# Patient Record
Sex: FEMALE | Race: WHITE | NOT HISPANIC OR LATINO | Employment: OTHER | URBAN - METROPOLITAN AREA
[De-identification: names, ages, dates, MRNs, and addresses within clinical notes are randomized per-mention and may not be internally consistent; named-entity substitution may affect disease eponyms.]

---

## 2023-04-17 LAB
ALANINE AMINOTRANSFERASE (SGPT) (U/L) IN SER/PLAS: 16 U/L (ref 7–45)
ALBUMIN (G/DL) IN SER/PLAS: 4.2 G/DL (ref 3.4–5)
ALKALINE PHOSPHATASE (U/L) IN SER/PLAS: 153 U/L (ref 33–136)
ANION GAP IN SER/PLAS: 13 MMOL/L (ref 10–20)
ASPARTATE AMINOTRANSFERASE (SGOT) (U/L) IN SER/PLAS: 17 U/L (ref 9–39)
BILIRUBIN TOTAL (MG/DL) IN SER/PLAS: 1.5 MG/DL (ref 0–1.2)
CALCIDIOL (25 OH VITAMIN D3) (NG/ML) IN SER/PLAS: 41 NG/ML
CALCIUM (MG/DL) IN SER/PLAS: 9.5 MG/DL (ref 8.6–10.3)
CARBON DIOXIDE, TOTAL (MMOL/L) IN SER/PLAS: 27 MMOL/L (ref 21–32)
CHLORIDE (MMOL/L) IN SER/PLAS: 102 MMOL/L (ref 98–107)
CREATININE (MG/DL) IN SER/PLAS: 1.22 MG/DL (ref 0.5–1.05)
GFR FEMALE: 46 ML/MIN/1.73M2
GLUCOSE (MG/DL) IN SER/PLAS: 103 MG/DL (ref 74–99)
POTASSIUM (MMOL/L) IN SER/PLAS: 4 MMOL/L (ref 3.5–5.3)
PROTEIN TOTAL: 7.1 G/DL (ref 6.4–8.2)
SODIUM (MMOL/L) IN SER/PLAS: 138 MMOL/L (ref 136–145)
THYROTROPIN (MIU/L) IN SER/PLAS BY DETECTION LIMIT <= 0.05 MIU/L: 0.14 MIU/L (ref 0.44–3.98)
THYROXINE (T4) FREE (NG/DL) IN SER/PLAS: 1.67 NG/DL (ref 0.61–1.12)
UREA NITROGEN (MG/DL) IN SER/PLAS: 17 MG/DL (ref 6–23)

## 2023-04-18 LAB
APPEARANCE, URINE: CLEAR
BILIRUBIN, URINE: NEGATIVE
BLOOD, URINE: NEGATIVE
COLOR, URINE: COLORLESS
GLUCOSE, URINE: NEGATIVE MG/DL
KETONES, URINE: NEGATIVE MG/DL
LEUKOCYTE ESTERASE, URINE: NEGATIVE
NITRITE, URINE: NEGATIVE
PH, URINE: 5 (ref 5–8)
PROTEIN, URINE: NEGATIVE MG/DL
SPECIFIC GRAVITY, URINE: 1 (ref 1–1.03)
UROBILINOGEN, URINE: <2 MG/DL (ref 0–1.9)

## 2023-04-19 LAB — URINE CULTURE: NORMAL

## 2023-07-24 LAB
ALANINE AMINOTRANSFERASE (SGPT) (U/L) IN SER/PLAS: 24 U/L (ref 7–45)
ALBUMIN (G/DL) IN SER/PLAS: 4 G/DL (ref 3.4–5)
ALKALINE PHOSPHATASE (U/L) IN SER/PLAS: 132 U/L (ref 33–136)
ANION GAP IN SER/PLAS: 13 MMOL/L (ref 10–20)
ASPARTATE AMINOTRANSFERASE (SGOT) (U/L) IN SER/PLAS: 18 U/L (ref 9–39)
BILIRUBIN TOTAL (MG/DL) IN SER/PLAS: 1.7 MG/DL (ref 0–1.2)
CALCIDIOL (25 OH VITAMIN D3) (NG/ML) IN SER/PLAS: 43 NG/ML
CALCIUM (MG/DL) IN SER/PLAS: 9.1 MG/DL (ref 8.6–10.3)
CARBON DIOXIDE, TOTAL (MMOL/L) IN SER/PLAS: 27 MMOL/L (ref 21–32)
CHLORIDE (MMOL/L) IN SER/PLAS: 106 MMOL/L (ref 98–107)
CREATININE (MG/DL) IN SER/PLAS: 1.17 MG/DL (ref 0.5–1.05)
GFR FEMALE: 48 ML/MIN/1.73M2
GLUCOSE (MG/DL) IN SER/PLAS: 91 MG/DL (ref 74–99)
POTASSIUM (MMOL/L) IN SER/PLAS: 3.9 MMOL/L (ref 3.5–5.3)
PROTEIN TOTAL: 6.8 G/DL (ref 6.4–8.2)
SODIUM (MMOL/L) IN SER/PLAS: 142 MMOL/L (ref 136–145)
THYROPEROXIDASE AB (IU/ML) IN SER/PLAS: <28 IU/ML
THYROTROPIN (MIU/L) IN SER/PLAS BY DETECTION LIMIT <= 0.05 MIU/L: 3.43 MIU/L (ref 0.44–3.98)
THYROXINE (T4) FREE (NG/DL) IN SER/PLAS: 1.45 NG/DL (ref 0.61–1.12)
TRIIODOTHYRONINE (T3) FREE (PG/ML) IN SER/PLAS: 2.7 PG/ML (ref 2.3–4.2)
UREA NITROGEN (MG/DL) IN SER/PLAS: 18 MG/DL (ref 6–23)

## 2023-07-27 LAB — TSH RECEPTOR ANTIBODY: <0.8 IU/L

## 2023-07-28 LAB — THYROID STIMULATING IMMUNOGLOBULIN: <1 TSI INDEX

## 2023-09-11 LAB
ALANINE AMINOTRANSFERASE (SGPT) (U/L) IN SER/PLAS: 22 U/L (ref 7–45)
ALBUMIN (G/DL) IN SER/PLAS: 4.1 G/DL (ref 3.4–5)
ALBUMIN (G/DL) IN SER/PLAS: 4.1 G/DL (ref 3.4–5)
ALKALINE PHOSPHATASE (U/L) IN SER/PLAS: 128 U/L (ref 33–136)
ANION GAP IN SER/PLAS: 13 MMOL/L (ref 10–20)
ASPARTATE AMINOTRANSFERASE (SGOT) (U/L) IN SER/PLAS: 19 U/L (ref 9–39)
BILIRUBIN DIRECT (MG/DL) IN SER/PLAS: 0.2 MG/DL (ref 0–0.3)
BILIRUBIN TOTAL (MG/DL) IN SER/PLAS: 1.8 MG/DL (ref 0–1.2)
CALCIUM (MG/DL) IN SER/PLAS: 9.2 MG/DL (ref 8.6–10.3)
CARBON DIOXIDE, TOTAL (MMOL/L) IN SER/PLAS: 28 MMOL/L (ref 21–32)
CHLORIDE (MMOL/L) IN SER/PLAS: 99 MMOL/L (ref 98–107)
CREATININE (MG/DL) IN SER/PLAS: 1.35 MG/DL (ref 0.5–1.05)
GFR FEMALE: 41 ML/MIN/1.73M2
GLUCOSE (MG/DL) IN SER/PLAS: 106 MG/DL (ref 74–99)
PHOSPHATE (MG/DL) IN SER/PLAS: 3.8 MG/DL (ref 2.5–4.9)
POTASSIUM (MMOL/L) IN SER/PLAS: 4.7 MMOL/L (ref 3.5–5.3)
PROTEIN TOTAL: 6.7 G/DL (ref 6.4–8.2)
SODIUM (MMOL/L) IN SER/PLAS: 135 MMOL/L (ref 136–145)
UREA NITROGEN (MG/DL) IN SER/PLAS: 19 MG/DL (ref 6–23)

## 2023-09-19 PROBLEM — K92.1 MELENA: Status: ACTIVE | Noted: 2023-09-19

## 2023-09-19 PROBLEM — K44.9 HIATAL HERNIA WITH GERD: Status: ACTIVE | Noted: 2023-09-19

## 2023-09-19 PROBLEM — I10 ESSENTIAL HYPERTENSION: Status: ACTIVE | Noted: 2023-09-19

## 2023-09-19 PROBLEM — I47.29 PAROXYSMAL VENTRICULAR TACHYCARDIA (MULTI): Status: ACTIVE | Noted: 2023-09-19

## 2023-09-19 PROBLEM — Z96.659 STATUS POST TOTAL KNEE REPLACEMENT: Status: ACTIVE | Noted: 2023-09-19

## 2023-09-19 PROBLEM — I47.20 PAROXYSMAL VENTRICULAR TACHYCARDIA: Status: ACTIVE | Noted: 2023-09-19

## 2023-09-19 PROBLEM — M89.9 SCAPULAR DYSFUNCTION: Status: ACTIVE | Noted: 2023-09-19

## 2023-09-19 PROBLEM — M54.2 NECK PAIN: Status: ACTIVE | Noted: 2023-09-19

## 2023-09-19 PROBLEM — R00.2 PALPITATIONS: Status: ACTIVE | Noted: 2023-09-19

## 2023-09-19 PROBLEM — E87.6 HYPOKALEMIA: Status: ACTIVE | Noted: 2023-09-19

## 2023-09-19 PROBLEM — I25.9 ISCHEMIA OF HEART, CHRONIC: Status: ACTIVE | Noted: 2023-09-19

## 2023-09-19 PROBLEM — K21.9 HIATAL HERNIA WITH GERD: Status: ACTIVE | Noted: 2023-09-19

## 2023-09-19 PROBLEM — I07.1 TRICUSPID REGURGITATION: Status: ACTIVE | Noted: 2023-09-19

## 2023-09-19 PROBLEM — R00.0 WIDE-COMPLEX TACHYCARDIA: Status: ACTIVE | Noted: 2023-09-19

## 2023-09-19 PROBLEM — M47.812 CERVICAL SPINE DEGENERATION: Status: ACTIVE | Noted: 2023-09-19

## 2023-09-19 PROBLEM — N95.2 VAGINAL ATROPHY: Status: ACTIVE | Noted: 2023-09-19

## 2023-09-19 PROBLEM — E78.2 MIXED HYPERLIPIDEMIA: Status: ACTIVE | Noted: 2023-09-19

## 2023-09-19 PROBLEM — M54.12 CERVICAL RADICULAR PAIN: Status: ACTIVE | Noted: 2023-09-19

## 2023-09-19 PROBLEM — S29.012A RHOMBOID MUSCLE STRAIN: Status: ACTIVE | Noted: 2023-09-19

## 2023-09-19 PROBLEM — N18.30 STAGE 3 CHRONIC KIDNEY DISEASE (MULTI): Status: ACTIVE | Noted: 2023-09-19

## 2023-09-19 PROBLEM — I25.10 CORONARY ARTERY DISEASE INVOLVING NATIVE CORONARY ARTERY OF NATIVE HEART WITHOUT ANGINA PECTORIS: Status: ACTIVE | Noted: 2023-09-19

## 2023-09-19 PROBLEM — E03.9 HYPOTHYROIDISM: Status: ACTIVE | Noted: 2023-09-19

## 2023-09-19 PROBLEM — R09.89 CAROTID BRUIT: Status: ACTIVE | Noted: 2023-09-19

## 2023-09-19 RX ORDER — ATORVASTATIN CALCIUM 10 MG/1
1 TABLET, FILM COATED ORAL DAILY
COMMUNITY
End: 2023-12-12

## 2023-09-19 RX ORDER — AMIODARONE HYDROCHLORIDE 200 MG/1
200 TABLET ORAL DAILY
COMMUNITY
Start: 2013-09-17 | End: 2024-01-12 | Stop reason: SDUPTHER

## 2023-09-19 RX ORDER — PANTOPRAZOLE SODIUM 40 MG/1
40 TABLET, DELAYED RELEASE ORAL DAILY
COMMUNITY

## 2023-09-19 RX ORDER — ACETAMINOPHEN 500 MG
TABLET ORAL
COMMUNITY
End: 2023-10-02 | Stop reason: SDUPTHER

## 2023-09-19 RX ORDER — POTASSIUM CHLORIDE 750 MG/1
10 TABLET, EXTENDED RELEASE ORAL DAILY
COMMUNITY
Start: 2023-04-15

## 2023-09-19 RX ORDER — LOSARTAN POTASSIUM 100 MG/1
1 TABLET ORAL DAILY
COMMUNITY
End: 2023-10-02 | Stop reason: ALTCHOICE

## 2023-09-19 RX ORDER — LEVOTHYROXINE SODIUM 50 UG/1
1 TABLET ORAL DAILY
COMMUNITY

## 2023-09-19 RX ORDER — LANOLIN ALCOHOL/MO/W.PET/CERES
1 CREAM (GRAM) TOPICAL DAILY
COMMUNITY
End: 2023-12-20 | Stop reason: SDUPTHER

## 2023-09-19 RX ORDER — NITROGLYCERIN 0.4 MG/1
0.4 TABLET SUBLINGUAL EVERY 5 MIN PRN
COMMUNITY

## 2023-09-19 RX ORDER — AMLODIPINE BESYLATE 5 MG/1
1 TABLET ORAL DAILY
COMMUNITY
Start: 2022-07-27 | End: 2023-11-08 | Stop reason: ALTCHOICE

## 2023-09-19 RX ORDER — KETOCONAZOLE 20 MG/ML
1 SHAMPOO, SUSPENSION TOPICAL SEE ADMIN INSTRUCTIONS
COMMUNITY

## 2023-10-02 ENCOUNTER — OFFICE VISIT (OUTPATIENT)
Dept: OBSTETRICS AND GYNECOLOGY | Facility: CLINIC | Age: 78
End: 2023-10-02
Payer: MEDICARE

## 2023-10-02 ENCOUNTER — TELEPHONE (OUTPATIENT)
Dept: OBSTETRICS AND GYNECOLOGY | Facility: CLINIC | Age: 78
End: 2023-10-02

## 2023-10-02 VITALS — WEIGHT: 147 LBS | BODY MASS INDEX: 28 KG/M2 | DIASTOLIC BLOOD PRESSURE: 60 MMHG | SYSTOLIC BLOOD PRESSURE: 124 MMHG

## 2023-10-02 DIAGNOSIS — N84.0 ENDOMETRIAL POLYP: Primary | ICD-10-CM

## 2023-10-02 PROCEDURE — 58558 HYSTEROSCOPY BIOPSY: CPT | Performed by: OBSTETRICS & GYNECOLOGY

## 2023-10-02 PROCEDURE — 88305 TISSUE EXAM BY PATHOLOGIST: CPT

## 2023-10-02 PROCEDURE — 3074F SYST BP LT 130 MM HG: CPT | Performed by: OBSTETRICS & GYNECOLOGY

## 2023-10-02 PROCEDURE — 1126F AMNT PAIN NOTED NONE PRSNT: CPT | Performed by: OBSTETRICS & GYNECOLOGY

## 2023-10-02 PROCEDURE — 3078F DIAST BP <80 MM HG: CPT | Performed by: OBSTETRICS & GYNECOLOGY

## 2023-10-02 PROCEDURE — 88305 TISSUE EXAM BY PATHOLOGIST: CPT | Performed by: PATHOLOGY

## 2023-10-02 PROCEDURE — 99204 OFFICE O/P NEW MOD 45 MIN: CPT | Performed by: OBSTETRICS & GYNECOLOGY

## 2023-10-02 PROCEDURE — 1159F MED LIST DOCD IN RCRD: CPT | Performed by: OBSTETRICS & GYNECOLOGY

## 2023-10-02 RX ORDER — MIRABEGRON 25 MG/1
TABLET, FILM COATED, EXTENDED RELEASE ORAL
COMMUNITY
Start: 2023-08-09 | End: 2024-06-06 | Stop reason: WASHOUT

## 2023-10-02 RX ORDER — ACETAMINOPHEN 500 MG
TABLET ORAL
COMMUNITY

## 2023-10-02 ASSESSMENT — ENCOUNTER SYMPTOMS
LOSS OF SENSATION IN FEET: 0
DEPRESSION: 0
OCCASIONAL FEELINGS OF UNSTEADINESS: 0

## 2023-10-02 ASSESSMENT — PAIN SCALES - GENERAL: PAINLEVEL: 0-NO PAIN

## 2023-10-02 NOTE — PROGRESS NOTES
HYSTEROSCOPY WITH POLYPECTOMY OFFICE PROCEDURE NOTE    Patient was counseling on risks of office Hysteroscopy.  A written consent was then obtained.  Patient was placed in lithotomy position on the procedure room table.  A speculum was placed in the vagina.  A paracervical block of 10 mL of 0.25% marcaine was placed.     The Avipsy Opal hysteroscope was then inserted vaginascopicly into the endometrial cavity, normal saline was used for the hysteroscopic fluid medium.  The resectoscope was placed through the hysteroscope  tissue was removed under direct visualization. Procedure was then ended, the instruments removed from the uterus and vagina. The patient tolerated the procedure well.  Blood loss was minimal. Pathology sent.    Arash Juarez MD

## 2023-10-02 NOTE — PROGRESS NOTES
Patient is a 78 y.o. year old who presents to the clinic today for endometrial polyps.    Problems:  - Patient is having issues with urinary incontinence.  Noted that she doesn't realize when she is leaking.  Feels that she is about 80-90% better with the medication she was provided.  - She had an ultrasound done which showed endometrial polyps.    Discussion  - Reviewed urge incontinence and discussed treatment options which include intradetrusor Botox injections or interstim.  - Recommended a hysteroscopy with endometrial biopsy to remove the endometrial polyps.    Assessment:  78 y.o. female being assessed for urge incontinence and endometrial polyps. S/P hysteroscopy with endometrial biopsy.    Plan  - Follow pathology    Follow up as scheduled    Patrick HAIDER am scribing for virtually, and in the presence of Dr. Arash Juarez on  10/02/23 at 1:19 PM     Agree with idania, Arash Juarez, I personally performed the services described in the documentation which was scribed virtually confirm is both complete and accurate. DS

## 2023-10-05 ENCOUNTER — HOSPITAL ENCOUNTER (OUTPATIENT)
Dept: RESPIRATORY THERAPY | Facility: HOSPITAL | Age: 78
Discharge: HOME | End: 2023-10-05
Payer: MEDICARE

## 2023-10-05 DIAGNOSIS — Z79.899 OTHER LONG TERM (CURRENT) DRUG THERAPY: ICD-10-CM

## 2023-10-05 DIAGNOSIS — Z79.899 HIGH RISK MEDICATION USE: ICD-10-CM

## 2023-10-05 LAB
MGC ASCENT PFT - FEV1 - PRE: 2.06
MGC ASCENT PFT - FEV1 - PRE: 2.06
MGC ASCENT PFT - FEV1 - PREDICTED: 1.77
MGC ASCENT PFT - FEV1 - PREDICTED: 1.77
MGC ASCENT PFT - FVC - PRE: 2.53
MGC ASCENT PFT - FVC - PRE: 2.53
MGC ASCENT PFT - FVC - PREDICTED: 2.28
MGC ASCENT PFT - FVC - PREDICTED: 2.28

## 2023-10-05 PROCEDURE — 94200 LUNG FUNCTION TEST (MBC/MVV): CPT

## 2023-10-06 ENCOUNTER — TELEPHONE (OUTPATIENT)
Dept: CARDIOLOGY | Facility: CLINIC | Age: 78
End: 2023-10-06
Payer: COMMERCIAL

## 2023-10-10 LAB
LABORATORY COMMENT REPORT: NORMAL
PATH REPORT.FINAL DX SPEC: NORMAL
PATH REPORT.GROSS SPEC: NORMAL
PATH REPORT.RELEVANT HX SPEC: NORMAL
PATH REPORT.TOTAL CANCER: NORMAL

## 2023-10-24 ENCOUNTER — LAB (OUTPATIENT)
Dept: LAB | Facility: LAB | Age: 78
End: 2023-10-24
Payer: MEDICARE

## 2023-10-24 DIAGNOSIS — E03.9 HYPOTHYROIDISM, UNSPECIFIED: Primary | ICD-10-CM

## 2023-10-24 DIAGNOSIS — E55.9 VITAMIN D DEFICIENCY, UNSPECIFIED: ICD-10-CM

## 2023-10-24 DIAGNOSIS — E03.9 HYPOTHYROIDISM, UNSPECIFIED: ICD-10-CM

## 2023-10-24 LAB
25(OH)D3 SERPL-MCNC: 50 NG/ML (ref 30–100)
ALBUMIN SERPL BCP-MCNC: 4 G/DL (ref 3.4–5)
ALP SERPL-CCNC: 120 U/L (ref 33–136)
ALT SERPL W P-5'-P-CCNC: 17 U/L (ref 7–45)
ANION GAP SERPL CALC-SCNC: 14 MMOL/L (ref 10–20)
AST SERPL W P-5'-P-CCNC: 18 U/L (ref 9–39)
BILIRUB SERPL-MCNC: 2.2 MG/DL (ref 0–1.2)
BUN SERPL-MCNC: 30 MG/DL (ref 6–23)
CALCIUM SERPL-MCNC: 9.2 MG/DL (ref 8.6–10.3)
CHLORIDE SERPL-SCNC: 98 MMOL/L (ref 98–107)
CO2 SERPL-SCNC: 27 MMOL/L (ref 21–32)
CREAT SERPL-MCNC: 1.49 MG/DL (ref 0.5–1.05)
ERYTHROCYTE [DISTWIDTH] IN BLOOD BY AUTOMATED COUNT: 14.8 % (ref 11.5–14.5)
GFR SERPL CREATININE-BSD FRML MDRD: 36 ML/MIN/1.73M*2
GLUCOSE SERPL-MCNC: 98 MG/DL (ref 74–99)
HCT VFR BLD AUTO: 41.5 % (ref 36–46)
HGB BLD-MCNC: 13.3 G/DL (ref 12–16)
MCH RBC QN AUTO: 28.1 PG (ref 26–34)
MCHC RBC AUTO-ENTMCNC: 32 G/DL (ref 32–36)
MCV RBC AUTO: 88 FL (ref 80–100)
NRBC BLD-RTO: 0 /100 WBCS (ref 0–0)
PLATELET # BLD AUTO: 226 X10*3/UL (ref 150–450)
PMV BLD AUTO: 10.6 FL (ref 7.5–11.5)
POTASSIUM SERPL-SCNC: 4.4 MMOL/L (ref 3.5–5.3)
PROT SERPL-MCNC: 6.5 G/DL (ref 6.4–8.2)
RBC # BLD AUTO: 4.73 X10*6/UL (ref 4–5.2)
SODIUM SERPL-SCNC: 135 MMOL/L (ref 136–145)
T4 FREE SERPL-MCNC: 1.83 NG/DL (ref 0.61–1.12)
TSH SERPL-ACNC: 1.15 MIU/L (ref 0.44–3.98)
WBC # BLD AUTO: 8.7 X10*3/UL (ref 4.4–11.3)

## 2023-10-24 PROCEDURE — 85027 COMPLETE CBC AUTOMATED: CPT

## 2023-10-24 PROCEDURE — 84439 ASSAY OF FREE THYROXINE: CPT

## 2023-10-24 PROCEDURE — 84443 ASSAY THYROID STIM HORMONE: CPT

## 2023-10-24 PROCEDURE — 82306 VITAMIN D 25 HYDROXY: CPT

## 2023-10-24 PROCEDURE — 36415 COLL VENOUS BLD VENIPUNCTURE: CPT

## 2023-10-24 PROCEDURE — 80053 COMPREHEN METABOLIC PANEL: CPT

## 2023-11-08 ENCOUNTER — OFFICE VISIT (OUTPATIENT)
Dept: OBSTETRICS AND GYNECOLOGY | Facility: CLINIC | Age: 78
End: 2023-11-08
Payer: MEDICARE

## 2023-11-08 VITALS — SYSTOLIC BLOOD PRESSURE: 122 MMHG | DIASTOLIC BLOOD PRESSURE: 62 MMHG | WEIGHT: 148 LBS | BODY MASS INDEX: 28.19 KG/M2

## 2023-11-08 DIAGNOSIS — N39.41 URGE INCONTINENCE OF URINE: Primary | ICD-10-CM

## 2023-11-08 DIAGNOSIS — N39.41 URGE INCONTINENCE: ICD-10-CM

## 2023-11-08 PROCEDURE — 3078F DIAST BP <80 MM HG: CPT | Performed by: OBSTETRICS & GYNECOLOGY

## 2023-11-08 PROCEDURE — 3074F SYST BP LT 130 MM HG: CPT | Performed by: OBSTETRICS & GYNECOLOGY

## 2023-11-08 PROCEDURE — 1126F AMNT PAIN NOTED NONE PRSNT: CPT | Performed by: OBSTETRICS & GYNECOLOGY

## 2023-11-08 PROCEDURE — 1159F MED LIST DOCD IN RCRD: CPT | Performed by: OBSTETRICS & GYNECOLOGY

## 2023-11-08 PROCEDURE — 99214 OFFICE O/P EST MOD 30 MIN: CPT | Performed by: OBSTETRICS & GYNECOLOGY

## 2023-11-08 NOTE — PROGRESS NOTES
GYN PROGRESS NOTE          CC:     Chief Complaint   Patient presents with    Follow-up     Est pt hysteroscopy fu  Pt gets dull pain on right breast  Chaperone student       HPI:  Patient answers are not available for this visit.  HPI       Follow-up     Additional comments: Est pt hysteroscopy fu  Pt gets dull pain on right breast  Chaperone student          Last edited by Hilda Reynolds MA on 2023 10:44 AM.      Benign polyps      ROS:  GEN - no fevers or chills  RESP - no SOB or cough  GYN - see HPI      HISTORY:  Past Medical History:   Diagnosis Date    Anesthesia of skin 2020    Numbness and tingling    Body mass index (BMI) 31.0-31.9, adult 2022    BMI 31.0-31.9,adult    Other general symptoms and signs 2020    Eye, ear, nose, and throat symptom    Other specified counseling 2022    Encounter for medication counseling    Overweight 2022    Overweight    Pain in unspecified knee     Knee pain    Person consulting for explanation of examination or test findings 2022    Encounter to discuss test results    Personal history of (healed) traumatic fracture     History of fracture of foot    Personal history of other complications of pregnancy, childbirth and the puerperium     History of spontaneous     Personal history of other diseases of the circulatory system     History of hypertension    Personal history of other diseases of the circulatory system 2020    History of cardiac disorder    Personal history of other diseases of the digestive system     History of hiatal hernia    Personal history of other diseases of the musculoskeletal system and connective tissue 2020    History of arthritis    Personal history of other diseases of the respiratory system 2021    History of sinusitis    Personal history of other endocrine, nutritional and metabolic disease     History of hyperlipidemia    Personal history of other medical treatment 2015     History of screening mammography    Personal history of other specified conditions 12/06/2021    History of dizziness    Unspecified visual loss 11/17/2020    Vision problems     Past Surgical History:   Procedure Laterality Date    CATARACT EXTRACTION  02/27/2014    Cataract Surgery    CHOLECYSTECTOMY  02/27/2014    Cholecystectomy    DILATION AND CURETTAGE OF UTERUS  02/27/2014    Dilation And Curettage    OTHER SURGICAL HISTORY  02/27/2014    Shaving Of Lesion Mohs' Technique    OTHER SURGICAL HISTORY  12/06/2021    Destruction    OTHER SURGICAL HISTORY  12/06/2021    Colonoscopy    OTHER SURGICAL HISTORY  12/06/2021    Percutaneous transluminal coronary angioplasty    OTHER SURGICAL HISTORY  12/06/2021    Knee surgery    OTHER SURGICAL HISTORY  07/27/2022    Colonoscopy    OTHER SURGICAL HISTORY  07/27/2022    Esophagogastroduodenoscopy    OTHER SURGICAL HISTORY  11/17/2020    Tonsillectomy    TOTAL KNEE ARTHROPLASTY  02/27/2014    Knee Replacement     Social History     Socioeconomic History    Marital status:      Spouse name: Not on file    Number of children: Not on file    Years of education: Not on file    Highest education level: Not on file   Occupational History    Not on file   Tobacco Use    Smoking status: Never    Smokeless tobacco: Not on file   Substance and Sexual Activity    Alcohol use: Not on file    Drug use: Not on file    Sexual activity: Not Currently     Comment:  passed   Other Topics Concern    Not on file   Social History Narrative    Not on file     Social Determinants of Health     Financial Resource Strain: Not on file   Food Insecurity: Not on file   Transportation Needs: Not on file   Physical Activity: Not on file   Stress: Not on file   Social Connections: Not on file   Intimate Partner Violence: Not on file   Housing Stability: Not on file     Family history is unknown by patient.       PHYSICAL EXAM:  /62 (BP Location: Left arm, Patient Position: Sitting)    Wt 67.1 kg (148 lb)   BMI 28.19 kg/m²   Physical examination:  General: No distress  Neck: No masses  Respiratory: No respiratory distress  Breasts: No masses or lesions lymphatic chains bilateral and adnexa without lymphadenopathy  Abdomen: soft nontender no hernias      IMPRESSION/PLAN:  78-year-old mastalgia normal exam, history of postmenopausal bleeding benign polypectomy performed, urge inontinence with financially toxic cost on Myrbetriq      Plan intradetrusor Botox 100U  May have breast imaging if desires          Arash Juarez MD

## 2023-11-27 ENCOUNTER — LAB (OUTPATIENT)
Dept: LAB | Facility: LAB | Age: 78
End: 2023-11-27
Payer: MEDICARE

## 2023-11-27 DIAGNOSIS — E55.9 VITAMIN D DEFICIENCY, UNSPECIFIED: ICD-10-CM

## 2023-11-27 DIAGNOSIS — R94.6 ABNORMAL RESULTS OF THYROID FUNCTION STUDIES: ICD-10-CM

## 2023-11-27 DIAGNOSIS — N18.31 CHRONIC KIDNEY DISEASE, STAGE 3A (MULTI): Primary | ICD-10-CM

## 2023-11-27 LAB
25(OH)D3 SERPL-MCNC: 45 NG/ML (ref 30–100)
ALBUMIN SERPL BCP-MCNC: 4.1 G/DL (ref 3.4–5)
ANION GAP SERPL CALC-SCNC: 15 MMOL/L (ref 10–20)
BUN SERPL-MCNC: 25 MG/DL (ref 6–23)
CALCIUM SERPL-MCNC: 9.1 MG/DL (ref 8.6–10.3)
CHLORIDE SERPL-SCNC: 95 MMOL/L (ref 98–107)
CO2 SERPL-SCNC: 26 MMOL/L (ref 21–32)
CREAT SERPL-MCNC: 1.55 MG/DL (ref 0.5–1.05)
CREAT UR-MCNC: 62.1 MG/DL (ref 20–320)
ERYTHROCYTE [DISTWIDTH] IN BLOOD BY AUTOMATED COUNT: 15 % (ref 11.5–14.5)
GFR SERPL CREATININE-BSD FRML MDRD: 35 ML/MIN/1.73M*2
GLUCOSE SERPL-MCNC: 106 MG/DL (ref 74–99)
HCT VFR BLD AUTO: 40.5 % (ref 36–46)
HGB BLD-MCNC: 13 G/DL (ref 12–16)
MCH RBC QN AUTO: 28.8 PG (ref 26–34)
MCHC RBC AUTO-ENTMCNC: 32.1 G/DL (ref 32–36)
MCV RBC AUTO: 90 FL (ref 80–100)
MICROALBUMIN UR-MCNC: <7 MG/L
MICROALBUMIN/CREAT UR: NORMAL MG/G{CREAT}
NRBC BLD-RTO: 0 /100 WBCS (ref 0–0)
PHOSPHATE SERPL-MCNC: 4.5 MG/DL (ref 2.5–4.9)
PLATELET # BLD AUTO: 205 X10*3/UL (ref 150–450)
POTASSIUM SERPL-SCNC: 4.1 MMOL/L (ref 3.5–5.3)
PTH-INTACT SERPL-MCNC: 45.6 PG/ML (ref 18.5–88)
RBC # BLD AUTO: 4.52 X10*6/UL (ref 4–5.2)
SODIUM SERPL-SCNC: 132 MMOL/L (ref 136–145)
TSH SERPL-ACNC: 1.41 MIU/L (ref 0.44–3.98)
WBC # BLD AUTO: 7.8 X10*3/UL (ref 4.4–11.3)

## 2023-11-27 PROCEDURE — 80069 RENAL FUNCTION PANEL: CPT

## 2023-11-27 PROCEDURE — 36415 COLL VENOUS BLD VENIPUNCTURE: CPT

## 2023-11-27 PROCEDURE — 83970 ASSAY OF PARATHORMONE: CPT

## 2023-11-27 PROCEDURE — 85027 COMPLETE CBC AUTOMATED: CPT

## 2023-11-27 PROCEDURE — 84443 ASSAY THYROID STIM HORMONE: CPT

## 2023-11-27 PROCEDURE — 82043 UR ALBUMIN QUANTITATIVE: CPT

## 2023-11-27 PROCEDURE — 82570 ASSAY OF URINE CREATININE: CPT

## 2023-11-27 PROCEDURE — 82306 VITAMIN D 25 HYDROXY: CPT

## 2023-11-30 DIAGNOSIS — E55.9 VITAMIN D DEFICIENCY, UNSPECIFIED: ICD-10-CM

## 2023-11-30 DIAGNOSIS — E03.9 HYPOTHYROIDISM, UNSPECIFIED: Primary | ICD-10-CM

## 2023-12-05 ENCOUNTER — PROCEDURE VISIT (OUTPATIENT)
Dept: OBSTETRICS AND GYNECOLOGY | Facility: CLINIC | Age: 78
End: 2023-12-05
Payer: MEDICARE

## 2023-12-05 VITALS — DIASTOLIC BLOOD PRESSURE: 74 MMHG | SYSTOLIC BLOOD PRESSURE: 124 MMHG

## 2023-12-05 DIAGNOSIS — N39.41 URGE INCONTINENCE OF URINE: ICD-10-CM

## 2023-12-05 DIAGNOSIS — R32 URINARY INCONTINENCE, UNSPECIFIED TYPE: ICD-10-CM

## 2023-12-05 LAB
POC BILIRUBIN, URINE: NEGATIVE
POC BLOOD, URINE: ABNORMAL
POC GLUCOSE, URINE: NEGATIVE MG/DL
POC KETONES, URINE: NEGATIVE MG/DL
POC LEUKOCYTES, URINE: ABNORMAL
POC NITRITE,URINE: NEGATIVE
POC PH, URINE: 6 PH
POC PROTEIN, URINE: NEGATIVE MG/DL
POC SPECIFIC GRAVITY, URINE: 1.01
POC UROBILINOGEN, URINE: 0.2 EU/DL

## 2023-12-05 PROCEDURE — 81003 URINALYSIS AUTO W/O SCOPE: CPT | Performed by: OBSTETRICS & GYNECOLOGY

## 2023-12-05 PROCEDURE — 52287 CYSTOSCOPY CHEMODENERVATION: CPT | Performed by: OBSTETRICS & GYNECOLOGY

## 2023-12-05 RX ORDER — ESTRADIOL 0.1 MG/G
CREAM VAGINAL
COMMUNITY
Start: 2023-05-11 | End: 2023-12-05 | Stop reason: SDUPTHER

## 2023-12-05 RX ORDER — FLUOCINONIDE TOPICAL SOLUTION USP, 0.05% 0.5 MG/ML
SOLUTION TOPICAL
COMMUNITY
Start: 2019-08-22

## 2023-12-05 RX ORDER — LISINOPRIL AND HYDROCHLOROTHIAZIDE 10; 12.5 MG/1; MG/1
TABLET ORAL
COMMUNITY
Start: 2023-09-26

## 2023-12-05 RX ORDER — ESTRADIOL 0.1 MG/G
CREAM VAGINAL
Qty: 42.5 G | Refills: 3 | Status: SHIPPED | OUTPATIENT
Start: 2023-12-05 | End: 2023-12-06 | Stop reason: SDUPTHER

## 2023-12-05 RX ORDER — FLUTICASONE PROPIONATE 50 MCG
SPRAY, SUSPENSION (ML) NASAL
COMMUNITY
Start: 2023-09-27

## 2023-12-05 RX ORDER — LIDOCAINE HYDROCHLORIDE 20 MG/ML
1 JELLY TOPICAL ONCE
Status: COMPLETED | OUTPATIENT
Start: 2023-12-05 | End: 2023-12-05

## 2023-12-05 RX ORDER — MELOXICAM 15 MG/1
15 TABLET ORAL DAILY PRN
COMMUNITY
Start: 2023-05-02 | End: 2023-12-05 | Stop reason: WASHOUT

## 2023-12-05 RX ORDER — DIAZEPAM 5 MG/1
TABLET ORAL
COMMUNITY
Start: 2023-06-24 | End: 2023-12-05 | Stop reason: WASHOUT

## 2023-12-05 RX ADMIN — LIDOCAINE HYDROCHLORIDE 1 APPLICATION: 20 JELLY TOPICAL at 11:55

## 2023-12-05 ASSESSMENT — PAIN SCALES - GENERAL: PAINLEVEL: 0-NO PAIN

## 2023-12-05 NOTE — PROGRESS NOTES
"GYN PROGRESS NOTE          CC:   No chief complaint on file.      HPI:  Patient answers are not available for this visit.  HPI    Katt  is a 77 yo patient here for a Cystoscopy with Intradetrusor Botox.  This is her 1st visit for intradetrusor botox.    Today, she has complaints of incontinence of urine.  The procedure was explained by CARLOS QUINTERO and Dr. Juarez  Consent was signed.  The patient had no questions or concerns at this time.  Doxycyline 100mg PO was given per office protocol.  See MAR  Time out was completed.  Verification Completed:  2 patient identifiers  Correct procedure  Correct site  Correct position  Correct equipment   Botox 100 Units \"office supply\"  I/O UA obtained see results.   LOT [id number]  EXP  10/2025  Chaperone CARLOS Quintero  Discharge instructions were reviewed.  The patient did not have any questions or concerns at this time. A follow up was scheduled.     Last edited by Ange Doe RN on 2023 11:11 AM.            ROS:  GEN - no fevers or chills  RESP - no SOB or cough  GYN - see HPI      HISTORY:  Past Medical History:   Diagnosis Date    Anesthesia of skin 2020    Numbness and tingling    Body mass index (BMI) 31.0-31.9, adult 2022    BMI 31.0-31.9,adult    Other general symptoms and signs 2020    Eye, ear, nose, and throat symptom    Other specified counseling 2022    Encounter for medication counseling    Overweight 2022    Overweight    Pain in unspecified knee     Knee pain    Person consulting for explanation of examination or test findings 2022    Encounter to discuss test results    Personal history of (healed) traumatic fracture     History of fracture of foot    Personal history of other complications of pregnancy, childbirth and the puerperium     History of spontaneous     Personal history of other diseases of the circulatory system     History of hypertension    Personal history of other diseases of the circulatory " system 11/17/2020    History of cardiac disorder    Personal history of other diseases of the digestive system     History of hiatal hernia    Personal history of other diseases of the musculoskeletal system and connective tissue 11/17/2020    History of arthritis    Personal history of other diseases of the respiratory system 12/06/2021    History of sinusitis    Personal history of other endocrine, nutritional and metabolic disease     History of hyperlipidemia    Personal history of other medical treatment 07/28/2015    History of screening mammography    Personal history of other specified conditions 12/06/2021    History of dizziness    Unspecified visual loss 11/17/2020    Vision problems     Past Surgical History:   Procedure Laterality Date    CATARACT EXTRACTION  02/27/2014    Cataract Surgery    CHOLECYSTECTOMY  02/27/2014    Cholecystectomy    DILATION AND CURETTAGE OF UTERUS  02/27/2014    Dilation And Curettage    OTHER SURGICAL HISTORY  02/27/2014    Shaving Of Lesion Mohs' Technique    OTHER SURGICAL HISTORY  12/06/2021    Destruction    OTHER SURGICAL HISTORY  12/06/2021    Colonoscopy    OTHER SURGICAL HISTORY  12/06/2021    Percutaneous transluminal coronary angioplasty    OTHER SURGICAL HISTORY  12/06/2021    Knee surgery    OTHER SURGICAL HISTORY  07/27/2022    Colonoscopy    OTHER SURGICAL HISTORY  07/27/2022    Esophagogastroduodenoscopy    OTHER SURGICAL HISTORY  11/17/2020    Tonsillectomy    TOTAL KNEE ARTHROPLASTY  02/27/2014    Knee Replacement     Social History     Socioeconomic History    Marital status:      Spouse name: Not on file    Number of children: Not on file    Years of education: Not on file    Highest education level: Not on file   Occupational History    Not on file   Tobacco Use    Smoking status: Never    Smokeless tobacco: Never   Vaping Use    Vaping Use: Never used   Substance and Sexual Activity    Alcohol use: Not on file    Drug use: Not on file    Sexual  activity: Not Currently     Comment:  passed   Other Topics Concern    Not on file   Social History Narrative    Not on file     Social Determinants of Health     Financial Resource Strain: Not on file   Food Insecurity: Not on file   Transportation Needs: Not on file   Physical Activity: Not on file   Stress: Not on file   Social Connections: Not on file   Intimate Partner Violence: Not on file   Housing Stability: Not on file     Family history is unknown by patient.       PHYSICAL EXAM:  /74 (BP Location: Left arm, Patient Position: Sitting, BP Cuff Size: Adult)   LMP  (LMP Unknown)   GEN:  A&O, NAD  HEENT:  head HC/AT, no visible goiter  PSYCH:  normal affect, non-anxious  Urethral carbuncle noted      Operative procedure patient was taken to the operating room after informed consent was obtained she is placed in supine position mask anesthesia was started without complication. She is prepped and draped in the normal sterile fashion and operative cystoscopy was placed inside the bladder bowel surfaces were surveyed bilateral reflux was noted from the ureter orifices. A urethral inspection was then performed upon removal of the cystoscope the bladder was drained and refilled clear sterile water. In a fan like projection 0.5ml aliquots of diluted Botox were introduced into the bladder muscle. The flush was used to inject into the trigone. Total Botox use was 100U in approximately 20ml saline. Scant bleeding was noted from the urothelial tissue, the bladder was drained and patient was awoken from anesthesia and transferred to PACU in stable condition.      IMPRESSION/PLAN:    78-year-old urge incontinence status post 100 units intradetrusor Botox  Follow-up in 6 months for repeat procedure discontinue oral bladder medication in 10 days  Call if issue        Arash Juarez MD

## 2023-12-06 DIAGNOSIS — R32 URINARY INCONTINENCE, UNSPECIFIED TYPE: ICD-10-CM

## 2023-12-06 RX ORDER — ESTRADIOL 0.1 MG/G
CREAM VAGINAL
Qty: 42.5 G | Refills: 3 | Status: SHIPPED | OUTPATIENT
Start: 2023-12-06

## 2023-12-08 DIAGNOSIS — E78.2 MIXED HYPERLIPIDEMIA: Primary | ICD-10-CM

## 2023-12-12 RX ORDER — ATORVASTATIN CALCIUM 10 MG/1
10 TABLET, FILM COATED ORAL DAILY
Qty: 90 TABLET | Refills: 3 | Status: SHIPPED | OUTPATIENT
Start: 2023-12-12

## 2023-12-20 DIAGNOSIS — R00.2 PALPITATIONS: ICD-10-CM

## 2023-12-20 DIAGNOSIS — I47.29 PAROXYSMAL VENTRICULAR TACHYCARDIA (MULTI): ICD-10-CM

## 2023-12-20 RX ORDER — LANOLIN ALCOHOL/MO/W.PET/CERES
1 CREAM (GRAM) TOPICAL 2 TIMES DAILY
Qty: 180 TABLET | Refills: 3 | Status: SHIPPED | OUTPATIENT
Start: 2023-12-20 | End: 2024-01-02 | Stop reason: SDUPTHER

## 2024-01-02 DIAGNOSIS — I47.29 PAROXYSMAL VENTRICULAR TACHYCARDIA (MULTI): ICD-10-CM

## 2024-01-02 DIAGNOSIS — R00.2 PALPITATIONS: ICD-10-CM

## 2024-01-02 DIAGNOSIS — I10 ESSENTIAL HYPERTENSION: ICD-10-CM

## 2024-01-02 NOTE — TELEPHONE ENCOUNTER
Patient left message [facility] mail away pharmacy told her they cannot get [facility] to fill her metoprolol succinate 25 mg one a day. Patient sees Dr Rivera will send to Sallie GALARZA NP to authorize. When I called her she also asked for magnesium oxide 400mg BID . Also sent to Sallie.

## 2024-01-03 RX ORDER — LANOLIN ALCOHOL/MO/W.PET/CERES
1 CREAM (GRAM) TOPICAL 2 TIMES DAILY
Qty: 180 TABLET | Refills: 3 | Status: SHIPPED | OUTPATIENT
Start: 2024-01-03 | End: 2025-01-02

## 2024-01-08 ENCOUNTER — APPOINTMENT (OUTPATIENT)
Dept: OBSTETRICS AND GYNECOLOGY | Facility: CLINIC | Age: 79
End: 2024-01-08
Payer: MEDICARE

## 2024-01-09 ENCOUNTER — LAB (OUTPATIENT)
Dept: LAB | Facility: LAB | Age: 79
End: 2024-01-09
Payer: MEDICARE

## 2024-01-09 DIAGNOSIS — E03.9 HYPOTHYROIDISM, UNSPECIFIED: Primary | ICD-10-CM

## 2024-01-09 DIAGNOSIS — E55.9 VITAMIN D DEFICIENCY, UNSPECIFIED: ICD-10-CM

## 2024-01-09 LAB
25(OH)D3 SERPL-MCNC: 43 NG/ML (ref 30–100)
25(OH)D3 SERPL-MCNC: 45 NG/ML (ref 30–100)
ALBUMIN SERPL BCP-MCNC: 3.9 G/DL (ref 3.4–5)
ALBUMIN SERPL BCP-MCNC: 4.1 G/DL (ref 3.4–5)
ALP SERPL-CCNC: 112 U/L (ref 33–136)
ALP SERPL-CCNC: 112 U/L (ref 33–136)
ALT SERPL W P-5'-P-CCNC: 18 U/L (ref 7–45)
ALT SERPL W P-5'-P-CCNC: 19 U/L (ref 7–45)
ANION GAP SERPL CALC-SCNC: 13 MMOL/L (ref 10–20)
ANION GAP SERPL CALC-SCNC: 15 MMOL/L (ref 10–20)
AST SERPL W P-5'-P-CCNC: 17 U/L (ref 9–39)
AST SERPL W P-5'-P-CCNC: 19 U/L (ref 9–39)
BILIRUB SERPL-MCNC: 2.4 MG/DL (ref 0–1.2)
BILIRUB SERPL-MCNC: 2.5 MG/DL (ref 0–1.2)
BUN SERPL-MCNC: 15 MG/DL (ref 6–23)
BUN SERPL-MCNC: 16 MG/DL (ref 6–23)
CALCIUM SERPL-MCNC: 8.7 MG/DL (ref 8.6–10.3)
CALCIUM SERPL-MCNC: 9.2 MG/DL (ref 8.6–10.3)
CHLORIDE SERPL-SCNC: 88 MMOL/L (ref 98–107)
CHLORIDE SERPL-SCNC: 88 MMOL/L (ref 98–107)
CO2 SERPL-SCNC: 26 MMOL/L (ref 21–32)
CO2 SERPL-SCNC: 27 MMOL/L (ref 21–32)
CREAT SERPL-MCNC: 1.13 MG/DL (ref 0.5–1.05)
CREAT SERPL-MCNC: 1.2 MG/DL (ref 0.5–1.05)
EGFRCR SERPLBLD CKD-EPI 2021: 47 ML/MIN/1.73M*2
EGFRCR SERPLBLD CKD-EPI 2021: 51 ML/MIN/1.73M*2
ERYTHROCYTE [DISTWIDTH] IN BLOOD BY AUTOMATED COUNT: 13.8 % (ref 11.5–14.5)
GLUCOSE SERPL-MCNC: 94 MG/DL (ref 74–99)
GLUCOSE SERPL-MCNC: 98 MG/DL (ref 74–99)
HCT VFR BLD AUTO: 38.2 % (ref 36–46)
HGB BLD-MCNC: 12.7 G/DL (ref 12–16)
MCH RBC QN AUTO: 29.1 PG (ref 26–34)
MCHC RBC AUTO-ENTMCNC: 33.2 G/DL (ref 32–36)
MCV RBC AUTO: 88 FL (ref 80–100)
NRBC BLD-RTO: 0 /100 WBCS (ref 0–0)
PLATELET # BLD AUTO: 270 X10*3/UL (ref 150–450)
POTASSIUM SERPL-SCNC: 4 MMOL/L (ref 3.5–5.3)
POTASSIUM SERPL-SCNC: 4.1 MMOL/L (ref 3.5–5.3)
PROT SERPL-MCNC: 6.1 G/DL (ref 6.4–8.2)
PROT SERPL-MCNC: 6.7 G/DL (ref 6.4–8.2)
RBC # BLD AUTO: 4.36 X10*6/UL (ref 4–5.2)
SODIUM SERPL-SCNC: 124 MMOL/L (ref 136–145)
SODIUM SERPL-SCNC: 125 MMOL/L (ref 136–145)
T3FREE SERPL-MCNC: 2.6 PG/ML (ref 2.3–4.2)
T4 FREE SERPL-MCNC: 1.68 NG/DL (ref 0.61–1.12)
T4 FREE SERPL-MCNC: 1.8 NG/DL (ref 0.61–1.12)
TSH SERPL-ACNC: 1.55 MIU/L (ref 0.44–3.98)
TSH SERPL-ACNC: 1.64 MIU/L (ref 0.44–3.98)
WBC # BLD AUTO: 8.9 X10*3/UL (ref 4.4–11.3)

## 2024-01-09 PROCEDURE — 84443 ASSAY THYROID STIM HORMONE: CPT

## 2024-01-09 PROCEDURE — 80053 COMPREHEN METABOLIC PANEL: CPT

## 2024-01-09 PROCEDURE — 82306 VITAMIN D 25 HYDROXY: CPT

## 2024-01-09 PROCEDURE — 84439 ASSAY OF FREE THYROXINE: CPT

## 2024-01-09 PROCEDURE — 85027 COMPLETE CBC AUTOMATED: CPT

## 2024-01-09 PROCEDURE — 84481 FREE ASSAY (FT-3): CPT

## 2024-01-09 PROCEDURE — 36415 COLL VENOUS BLD VENIPUNCTURE: CPT

## 2024-01-12 ENCOUNTER — OFFICE VISIT (OUTPATIENT)
Dept: CARDIOLOGY | Facility: CLINIC | Age: 79
End: 2024-01-12
Payer: MEDICARE

## 2024-01-12 VITALS
SYSTOLIC BLOOD PRESSURE: 130 MMHG | BODY MASS INDEX: 28.62 KG/M2 | DIASTOLIC BLOOD PRESSURE: 68 MMHG | HEART RATE: 57 BPM | WEIGHT: 149 LBS

## 2024-01-12 DIAGNOSIS — I10 ESSENTIAL HYPERTENSION: ICD-10-CM

## 2024-01-12 DIAGNOSIS — R00.2 PALPITATIONS: ICD-10-CM

## 2024-01-12 DIAGNOSIS — Z87.891 FORMER SMOKER: ICD-10-CM

## 2024-01-12 DIAGNOSIS — I49.3 PVC (PREMATURE VENTRICULAR CONTRACTION): ICD-10-CM

## 2024-01-12 DIAGNOSIS — I25.10 CORONARY ARTERY DISEASE INVOLVING NATIVE CORONARY ARTERY OF NATIVE HEART WITHOUT ANGINA PECTORIS: ICD-10-CM

## 2024-01-12 DIAGNOSIS — Z79.899 HIGH RISK MEDICATION USE: ICD-10-CM

## 2024-01-12 DIAGNOSIS — R00.0 WIDE-COMPLEX TACHYCARDIA: ICD-10-CM

## 2024-01-12 DIAGNOSIS — Z71.89 ENCOUNTER FOR MEDICATION REVIEW AND COUNSELING: ICD-10-CM

## 2024-01-12 DIAGNOSIS — Z71.89 ENCOUNTER TO DISCUSS TREATMENT OPTIONS: ICD-10-CM

## 2024-01-12 DIAGNOSIS — I47.29 PAROXYSMAL VENTRICULAR TACHYCARDIA (MULTI): Primary | ICD-10-CM

## 2024-01-12 DIAGNOSIS — E78.2 MIXED HYPERLIPIDEMIA: ICD-10-CM

## 2024-01-12 PROBLEM — N39.41 URGE INCONTINENCE OF URINE: Status: ACTIVE | Noted: 2023-05-15

## 2024-01-12 PROBLEM — R42 DIZZINESS: Status: ACTIVE | Noted: 2024-01-12

## 2024-01-12 PROCEDURE — 3078F DIAST BP <80 MM HG: CPT | Performed by: INTERNAL MEDICINE

## 2024-01-12 PROCEDURE — 1036F TOBACCO NON-USER: CPT | Performed by: INTERNAL MEDICINE

## 2024-01-12 PROCEDURE — 3075F SYST BP GE 130 - 139MM HG: CPT | Performed by: INTERNAL MEDICINE

## 2024-01-12 PROCEDURE — 1126F AMNT PAIN NOTED NONE PRSNT: CPT | Performed by: INTERNAL MEDICINE

## 2024-01-12 PROCEDURE — 99214 OFFICE O/P EST MOD 30 MIN: CPT | Performed by: INTERNAL MEDICINE

## 2024-01-12 PROCEDURE — 1159F MED LIST DOCD IN RCRD: CPT | Performed by: INTERNAL MEDICINE

## 2024-01-12 PROCEDURE — 93000 ELECTROCARDIOGRAM COMPLETE: CPT | Performed by: INTERNAL MEDICINE

## 2024-01-12 RX ORDER — ASPIRIN 81 MG/1
81 TABLET ORAL DAILY
COMMUNITY

## 2024-01-12 RX ORDER — AMIODARONE HYDROCHLORIDE 200 MG/1
100 TABLET ORAL DAILY
Qty: 90 TABLET | Refills: 3 | Status: SHIPPED | OUTPATIENT
Start: 2024-01-12

## 2024-01-12 ASSESSMENT — ENCOUNTER SYMPTOMS
PALPITATIONS: 0
SYNCOPE: 0
NEAR-SYNCOPE: 0
DYSPNEA ON EXERTION: 0
SHORTNESS OF BREATH: 0
IRREGULAR HEARTBEAT: 0

## 2024-01-12 NOTE — PROGRESS NOTES
Chief Complaint:   Patient here for a 6 month follow up     History Of Present Illness:    Katt Jacobson is a 78 y.o. female presenting with follow-up.    She has been under a lot of stress.  Her  passed away in October 2023.  The holidays were then stressful.    She has had no arrhythmia events.  She denies any palpitation, lightheadedness, near-syncope, or syncope     He is compliant with her medications.    Last Recorded Vitals:  Vitals:    01/12/24 0830   BP: 130/68   BP Location: Left arm   Patient Position: Sitting   Pulse: 57   Weight: 67.6 kg (149 lb)       Past Medical History:  See List    Past Surgical History:  See List    Social History:  She reports that she has never smoked. She has never used smokeless tobacco. She reports current alcohol use. She reports that she does not use drugs.    Family History:  Family History   Family history unknown: Yes        Allergies:  Patient has no known allergies.    Outpatient Medications:  Current Outpatient Medications   Medication Instructions    amiodarone (PACERONE) 200 mg, oral, Daily    aspirin 81 mg, oral, Daily    atorvastatin (LIPITOR) 10 mg, oral, Daily    cholecalciferol (Vitamin D-3) 50 mcg (2,000 unit) capsule oral    estradiol (Estrace) 0.01 % (0.1 mg/gram) vaginal cream Apply a pea size amount to the vulva and rub in completely    fluocinonide (Lidex) 0.05 % external solution Apply to scalp night before using Ketoconazole Shampoo    fluticasone (Flonase) 50 mcg/actuation nasal spray     ketoconazole (NIZOral) 2 % shampoo 1 Application, Topical, See admin instructions, Apply as directed by physician    levothyroxine (Synthroid, Levoxyl) 50 mcg tablet 1 tablet, oral, Daily    lisinopriL-hydrochlorothiazide 10-12.5 mg tablet     magnesium oxide (MAG-OX) 400 mg, oral, 2 times daily    metoprolol succinate XL (KAPSPARGO SPRINKLE) 25 mg, oral, Daily    Myrbetriq 25 mg tablet extended release 24 hr 24 hr tablet     nitroglycerin (NITROSTAT) 0.4  "mg, sublingual, Every 5 min PRN    pantoprazole (PROTONIX) 40 mg, oral, Daily    potassium chloride CR 10 mEq ER tablet 10 mEq, oral, Daily   Review of Systems   Constitutional: Negative for malaise/fatigue.   Cardiovascular:  Negative for chest pain, dyspnea on exertion, irregular heartbeat, near-syncope, palpitations and syncope.   Respiratory:  Negative for shortness of breath.    All other systems reviewed and are negative.    Physical Exam:  Constitutional:       General: Awake.      Appearance: Normal and healthy appearance. Well-developed and not in distress.   Neck:      Vascular: No JVR. JVD normal.   Pulmonary:      Effort: Pulmonary effort is normal.      Breath sounds: Normal breath sounds. No wheezing. No rhonchi. No rales.   Chest:      Chest wall: Not tender to palpatation.   Cardiovascular:      PMI at left midclavicular line. Normal rate. Regular rhythm. Normal S1. Normal S2.       Murmurs: There is no murmur.      No gallop.  No click. No rub.   Pulses:     Intact distal pulses.   Edema:     Peripheral edema absent.   Abdominal:      Tenderness: There is no abdominal tenderness.   Musculoskeletal: Normal range of motion.         General: No tenderness. Skin:     General: Skin is warm and dry.   Neurological:      General: No focal deficit present.      Mental Status: Alert and oriented to person, place and time.            Last Labs:  CBC -  Lab Results   Component Value Date    WBC 8.9 01/09/2024    HGB 12.7 01/09/2024    HCT 38.2 01/09/2024    MCV 88 01/09/2024     01/09/2024       CMP -  Lab Results   Component Value Date    CALCIUM 8.7 01/09/2024    PHOS 4.5 11/27/2023    PROT 6.1 (L) 01/09/2024    ALBUMIN 3.9 01/09/2024    AST 17 01/09/2024    ALT 18 01/09/2024    ALKPHOS 112 01/09/2024    BILITOT 2.4 (H) 01/09/2024       LIPID PANEL -   No results found for: \"CHOL\", \"TRIG\", \"HDL\", \"CHHDL\", \"LDLF\", \"VLDL\", \"NHDL\"    RENAL FUNCTION PANEL -   Lab Results   Component Value Date    GLUCOSE " 98 01/09/2024     (L) 01/09/2024    K 4.1 01/09/2024    CL 88 (L) 01/09/2024    CO2 26 01/09/2024    ANIONGAP 15 01/09/2024    BUN 15 01/09/2024    CREATININE 1.13 (H) 01/09/2024    CALCIUM 8.7 01/09/2024    PHOS 4.5 11/27/2023    ALBUMIN 3.9 01/09/2024        Lab Results   Component Value Date    HGBA1C 6.1 (A) 10/17/2022       Last Cardiology Tests:  ECG:       ECG: today sinus bradycardia.  First-degree AV block.  PACs.  Normal axis. Corrected QT interval 450 ms.      PFTs October 2023. Unchanged.  Normal DLCO    TFTs and LFTs within acceptable range.  January 2024    Lab review: I have personally reviewed the laboratory result(s) see aBOVE    Assessment/Plan   Diagnoses and all orders for this visit:  Paroxysmal ventricular tachycardia (CMS/HCC)  Palpitations  Essential hypertension  Wide-complex tachycardia  Coronary artery disease involving native coronary artery of native heart without angina pectoris  Mixed hyperlipidemia  PVC (premature ventricular contraction)  High risk medication use  Former smoker  BMI 28.0-28.9,adult  Encounter for medication review and counseling  Encounter to discuss treatment options  Harika Orellana RN    Ventricular tachycardia. Wide-complex tachycardia with no inducible arrhythmias by diagnostic EP testing in 2011. PVCs. Chronic. Stable and suppressed with amiodarone. Reviewed meds.  Refill.  Monitor QT corrected movable twice yearly  Normal EP conduction at baseline and with isoproterenol by EP testing November 2011.  High-risk medication of amiodarone. Ordered PFTs related  History of orthostatic dizziness by history.  Resolved. Reinforced behavioral modification.   Hypothyroidism, on replacement therapy. Stable. Chronic. Follows with endocrinology. Reviewed blood work with patient.   Coronary artery disease , chronic. Stable. History of cath in 2011 with minimal 20 to 30 percent in-stent circumflex restenosis. Asymptomatic. Continue with medical therapy. Refills .  Follows with Dr. Rice  Stress test in 2015 with no infarct and no ischemia. Stable. Chronic. Follows with Dr. Rice.  Valvular heart disease with mild MR and TR, imparied relaxtion, and LVEF 60% by echo July 2021  Hypertension. Benign, chronic, controlled. Stable. Reviewed medications.  Discussed refills   Hyperlipidemia, chronic. Stable. On statin. Continue atorvastatin. Blood work reviewed.  CKD stage III. Chronic. Stable. Reviewed blood work with patient.  Hyponatremia.  Recommend follow-up with renal.  Remote cholecystectomy  Overweight.        AHA recommendations for exercise, diet, and behavioral modification reviewed with pt.     The patient and I discussed the amiodarone screening, arrhythmia, ventricular tachycardia, ECG, PVCs, indications for and types of medications, discussion if and what medication refills needed, American Heart Association lifestyle changes and behavioral modification discussed. All questions answered in detail. Counseling over 50% visit regarding above. Patient appreciative of care.

## 2024-01-12 NOTE — PATIENT INSTRUCTIONS
Continue same medications/treatment.  Patient educated on proper medication use.  Patient educated on risk factor modification.  Please bring any lab results from other providers/physicians to your next appointment.    Please bring all medicines, vitamins, and herbal supplements with you when you come to the office.    Prescriptions will not be filled unless you are compliant with your follow up appointments or have a follow up appointment scheduled as per instruction of your physician. Refills should be requested at the time of your visit.    Follow up with Dr. Rivera in 6 months     RHONDA HAIDER RN, AM SCRIBING FOR AND IN THE PRESENCE OF DR. THANH RIVERA MD, FACC, FACP, FHPS

## 2024-01-18 DIAGNOSIS — E87.1 HYPO-OSMOLALITY AND HYPONATREMIA: Primary | ICD-10-CM

## 2024-02-08 ENCOUNTER — LAB (OUTPATIENT)
Dept: LAB | Facility: LAB | Age: 79
End: 2024-02-08
Payer: MEDICARE

## 2024-02-08 DIAGNOSIS — E87.1 HYPO-OSMOLALITY AND HYPONATREMIA: ICD-10-CM

## 2024-02-08 LAB
ALBUMIN SERPL BCP-MCNC: 4.1 G/DL (ref 3.4–5)
ALP SERPL-CCNC: 125 U/L (ref 33–136)
ALT SERPL W P-5'-P-CCNC: 17 U/L (ref 7–45)
ANION GAP SERPL CALC-SCNC: 12 MMOL/L (ref 10–20)
AST SERPL W P-5'-P-CCNC: 15 U/L (ref 9–39)
BILIRUB SERPL-MCNC: 1.5 MG/DL (ref 0–1.2)
BUN SERPL-MCNC: 27 MG/DL (ref 6–23)
CALCIUM SERPL-MCNC: 9 MG/DL (ref 8.6–10.3)
CHLORIDE SERPL-SCNC: 88 MMOL/L (ref 98–107)
CO2 SERPL-SCNC: 27 MMOL/L (ref 21–32)
CREAT SERPL-MCNC: 1.24 MG/DL (ref 0.5–1.05)
EGFRCR SERPLBLD CKD-EPI 2021: 45 ML/MIN/1.73M*2
GLUCOSE SERPL-MCNC: 97 MG/DL (ref 74–99)
POTASSIUM SERPL-SCNC: 4.1 MMOL/L (ref 3.5–5.3)
PROT SERPL-MCNC: 6.5 G/DL (ref 6.4–8.2)
SODIUM SERPL-SCNC: 123 MMOL/L (ref 136–145)

## 2024-02-08 PROCEDURE — 36415 COLL VENOUS BLD VENIPUNCTURE: CPT

## 2024-02-08 PROCEDURE — 80053 COMPREHEN METABOLIC PANEL: CPT

## 2024-02-19 ENCOUNTER — TELEPHONE (OUTPATIENT)
Dept: CARDIOLOGY | Facility: CLINIC | Age: 79
End: 2024-02-19
Payer: COMMERCIAL

## 2024-02-19 DIAGNOSIS — E03.9 HYPOTHYROIDISM, UNSPECIFIED: Primary | ICD-10-CM

## 2024-02-19 DIAGNOSIS — E87.1 HYPO-OSMOLALITY AND HYPONATREMIA: ICD-10-CM

## 2024-02-19 DIAGNOSIS — E55.9 VITAMIN D DEFICIENCY, UNSPECIFIED: ICD-10-CM

## 2024-02-19 NOTE — TELEPHONE ENCOUNTER
Per Dr Rivera she thinks the change is appropriate. I called and left message for patient that change is appropriate. She is to call if a problem.

## 2024-02-19 NOTE — TELEPHONE ENCOUNTER
----- Message from Katt Jacobson sent at 2/19/2024 12:08 PM EST -----  Regarding: Prescription changed  Contact: [phone]  My sodium level has decline in previous month    Upon the recommendation of Dr Sheldon  Discontinued prescription Lisinopril HCTZ 10-12.5 MG tablet 2 x day    Instead he is suggesting  Lisinopril 20 MG tablet once daily  Furosemide 20MG tablet once daily    Please advise if this is an appropriate substitution

## 2024-02-28 ENCOUNTER — LAB (OUTPATIENT)
Dept: LAB | Facility: LAB | Age: 79
End: 2024-02-28
Payer: MEDICARE

## 2024-02-28 DIAGNOSIS — N18.31 CHRONIC KIDNEY DISEASE, STAGE 3A (MULTI): Primary | ICD-10-CM

## 2024-02-28 LAB
ALBUMIN SERPL BCP-MCNC: 4.1 G/DL (ref 3.4–5)
ANION GAP SERPL CALC-SCNC: 12 MMOL/L (ref 10–20)
BUN SERPL-MCNC: 20 MG/DL (ref 6–23)
CALCIUM SERPL-MCNC: 9.4 MG/DL (ref 8.6–10.3)
CHLORIDE SERPL-SCNC: 96 MMOL/L (ref 98–107)
CO2 SERPL-SCNC: 30 MMOL/L (ref 21–32)
CREAT SERPL-MCNC: 1.35 MG/DL (ref 0.5–1.05)
CREAT UR-MCNC: 25.7 MG/DL (ref 20–320)
EGFRCR SERPLBLD CKD-EPI 2021: 41 ML/MIN/1.73M*2
ERYTHROCYTE [DISTWIDTH] IN BLOOD BY AUTOMATED COUNT: 14.1 % (ref 11.5–14.5)
GLUCOSE SERPL-MCNC: 102 MG/DL (ref 74–99)
HCT VFR BLD AUTO: 40.3 % (ref 36–46)
HGB BLD-MCNC: 13 G/DL (ref 12–16)
MCH RBC QN AUTO: 29.5 PG (ref 26–34)
MCHC RBC AUTO-ENTMCNC: 32.3 G/DL (ref 32–36)
MCV RBC AUTO: 91 FL (ref 80–100)
MICROALBUMIN UR-MCNC: <7 MG/L
MICROALBUMIN/CREAT UR: NORMAL MG/G{CREAT}
NRBC BLD-RTO: 0 /100 WBCS (ref 0–0)
PHOSPHATE SERPL-MCNC: 3.8 MG/DL (ref 2.5–4.9)
PLATELET # BLD AUTO: 242 X10*3/UL (ref 150–450)
POTASSIUM SERPL-SCNC: 4.1 MMOL/L (ref 3.5–5.3)
RBC # BLD AUTO: 4.41 X10*6/UL (ref 4–5.2)
SODIUM SERPL-SCNC: 134 MMOL/L (ref 136–145)
WBC # BLD AUTO: 9.7 X10*3/UL (ref 4.4–11.3)

## 2024-02-28 PROCEDURE — 80069 RENAL FUNCTION PANEL: CPT

## 2024-02-28 PROCEDURE — 82043 UR ALBUMIN QUANTITATIVE: CPT

## 2024-02-28 PROCEDURE — 36415 COLL VENOUS BLD VENIPUNCTURE: CPT

## 2024-02-28 PROCEDURE — 85027 COMPLETE CBC AUTOMATED: CPT

## 2024-02-28 PROCEDURE — 82570 ASSAY OF URINE CREATININE: CPT

## 2024-03-04 ENCOUNTER — TELEPHONE (OUTPATIENT)
Dept: CARDIOLOGY | Facility: CLINIC | Age: 79
End: 2024-03-04
Payer: COMMERCIAL

## 2024-03-04 NOTE — TELEPHONE ENCOUNTER
Received medication refill request from [facility] for amlopdipine. Medication was not on current medication list. Called patient and confirmed that this medication was discontinued.

## 2024-03-21 ENCOUNTER — LAB (OUTPATIENT)
Dept: LAB | Facility: LAB | Age: 79
End: 2024-03-21
Payer: MEDICARE

## 2024-03-21 DIAGNOSIS — E55.9 VITAMIN D DEFICIENCY, UNSPECIFIED: ICD-10-CM

## 2024-03-21 DIAGNOSIS — E03.9 HYPOTHYROIDISM, UNSPECIFIED: ICD-10-CM

## 2024-03-21 DIAGNOSIS — E87.1 HYPO-OSMOLALITY AND HYPONATREMIA: ICD-10-CM

## 2024-03-21 LAB
25(OH)D3 SERPL-MCNC: 45 NG/ML (ref 30–100)
ALBUMIN SERPL BCP-MCNC: 4 G/DL (ref 3.4–5)
ALP SERPL-CCNC: 117 U/L (ref 33–136)
ALT SERPL W P-5'-P-CCNC: 20 U/L (ref 7–45)
ANION GAP SERPL CALC-SCNC: 13 MMOL/L (ref 10–20)
AST SERPL W P-5'-P-CCNC: 16 U/L (ref 9–39)
BILIRUB SERPL-MCNC: 1.5 MG/DL (ref 0–1.2)
BUN SERPL-MCNC: 24 MG/DL (ref 6–23)
CALCIUM SERPL-MCNC: 9.1 MG/DL (ref 8.6–10.3)
CHLORIDE SERPL-SCNC: 98 MMOL/L (ref 98–107)
CO2 SERPL-SCNC: 29 MMOL/L (ref 21–32)
CREAT SERPL-MCNC: 1.3 MG/DL (ref 0.5–1.05)
EGFRCR SERPLBLD CKD-EPI 2021: 42 ML/MIN/1.73M*2
ERYTHROCYTE [DISTWIDTH] IN BLOOD BY AUTOMATED COUNT: 14.5 % (ref 11.5–14.5)
GLUCOSE SERPL-MCNC: 95 MG/DL (ref 74–99)
HCT VFR BLD AUTO: 40 % (ref 36–46)
HGB BLD-MCNC: 13 G/DL (ref 12–16)
MCH RBC QN AUTO: 29.6 PG (ref 26–34)
MCHC RBC AUTO-ENTMCNC: 32.5 G/DL (ref 32–36)
MCV RBC AUTO: 91 FL (ref 80–100)
NRBC BLD-RTO: 0 /100 WBCS (ref 0–0)
PLATELET # BLD AUTO: 220 X10*3/UL (ref 150–450)
POTASSIUM SERPL-SCNC: 4.3 MMOL/L (ref 3.5–5.3)
PROT SERPL-MCNC: 6.7 G/DL (ref 6.4–8.2)
RBC # BLD AUTO: 4.39 X10*6/UL (ref 4–5.2)
SODIUM SERPL-SCNC: 136 MMOL/L (ref 136–145)
T4 FREE SERPL-MCNC: 1.57 NG/DL (ref 0.61–1.12)
TSH SERPL-ACNC: 1.02 MIU/L (ref 0.44–3.98)
WBC # BLD AUTO: 10.1 X10*3/UL (ref 4.4–11.3)

## 2024-03-21 PROCEDURE — 84443 ASSAY THYROID STIM HORMONE: CPT

## 2024-03-21 PROCEDURE — 82306 VITAMIN D 25 HYDROXY: CPT

## 2024-03-21 PROCEDURE — 84439 ASSAY OF FREE THYROXINE: CPT

## 2024-03-21 PROCEDURE — 85027 COMPLETE CBC AUTOMATED: CPT

## 2024-03-21 PROCEDURE — 36415 COLL VENOUS BLD VENIPUNCTURE: CPT

## 2024-03-21 PROCEDURE — 80053 COMPREHEN METABOLIC PANEL: CPT

## 2024-04-01 DIAGNOSIS — E87.1 HYPO-OSMOLALITY AND HYPONATREMIA: ICD-10-CM

## 2024-04-01 DIAGNOSIS — E55.9 VITAMIN D DEFICIENCY, UNSPECIFIED: ICD-10-CM

## 2024-04-01 DIAGNOSIS — E03.9 HYPOTHYROIDISM, UNSPECIFIED: Primary | ICD-10-CM

## 2024-06-04 ENCOUNTER — OFFICE VISIT (OUTPATIENT)
Dept: ORTHOPEDIC SURGERY | Facility: CLINIC | Age: 79
End: 2024-06-04
Payer: MEDICARE

## 2024-06-04 DIAGNOSIS — G56.02 CARPAL TUNNEL SYNDROME OF LEFT WRIST: Primary | ICD-10-CM

## 2024-06-04 PROCEDURE — 1036F TOBACCO NON-USER: CPT | Performed by: ORTHOPAEDIC SURGERY

## 2024-06-04 PROCEDURE — 1159F MED LIST DOCD IN RCRD: CPT | Performed by: ORTHOPAEDIC SURGERY

## 2024-06-04 PROCEDURE — 99214 OFFICE O/P EST MOD 30 MIN: CPT | Performed by: ORTHOPAEDIC SURGERY

## 2024-06-04 PROCEDURE — 1157F ADVNC CARE PLAN IN RCRD: CPT | Performed by: ORTHOPAEDIC SURGERY

## 2024-06-04 NOTE — H&P (VIEW-ONLY)
6/4/2024    Chief Complaint   Patient presents with    Left Hand - Follow-up     CTS       History of Present Illness:  Patient Katt Jacobson , 79 y.o. female, presents today, 6/4/2024, for evaluation of left hand pain, numbness, and weakness.  Katt describes worsening history since December of last year of increasing numbness and tingling pain and weakness in the hand.  This is especially bad at night wakes her from sleep frequently.  She has history of cervical issues so she sleeps on her back but she states occasionally she wakes up in a curled up position and feels this is exacerbating some of her symptoms.  She denies any discrete injury or trauma associate with this.  She feels it is similar to when she dealt with carpal tunnel syndrome that she had on the right side years ago that ended up requiring surgery.  She has no recent EMG done.  She did do some PT/OT to see if this would help with her symptoms but did not make much progress with that.  She is right-hand dominant individual.  She has history of hypothyroidism, hyperlipidemia, stage III chronic kidney disease, coronary artery disease.       Review of Systems:   GENERAL: Negative  GI: Negative  MUSCULOSKELETAL: See HPI  SKIN: Negative  NEURO:  Negative     Physical Exam:  GENERAL:  Alert and oriented to person, place, and time.  No acute distress and breathing comfortably; pleasant and cooperative with the examination.  HEENT:  Head is normocephalic and atraumatic.  NECK:  Supple, no visible swelling.  CARDIOVASCULAR:  No palpable tachycardia.  LUNGS:  No audible wheezing or labored breathing.  ABDOMEN:  Nondistended.  Extremities: Evaluation of left upper extremity finds the patient to have a palpable radial artery at the wrist with brisk capillary refill to all digits. The patient has intact sensorium to axillary, radial, median and ulnar nerves. There are no open wounds. There are no signs of infection. There is no evidence of lymphedema  or lymphatic streaking. The patient has supple compartments of the left arm, forearm and hand.  Positive Tinel's over the median nerve the left wrist.  Positive Phalen's and direct compression maneuver over the median nerve.     Imaging/Test Results:  None today.     Assessment:  Left carpal tunnel syndrome.     Plan:  Treatment options were discussed including both operative and non-operative treatment strategies.  Patient elects to proceed forth with left carpal tunnel release under wide-awake approach.  Risks, benefits, and alternatives to surgery were discussed including, but not limited to infection risk, persistent or incomplete relief of pain, swelling, or stiffness, and post-operative pain and discomfort.  The patient verbalized agreement and understanding of the plan for care.  All questions answered at today's visit.  Plan for follow-up 10-14 days post-op.    In a face to face encounter, I performed a history and physical examination, discussed pertinent diagnostic studies if indicated, and discussed diagnosis and management strategies with both the patient and the mid-level provider. I reviewed the mid-level's note and agree with the documented findings and plan of care.  Patient presents today for evaluation of symptomatic left carpal tunnel syndrome.  She has symptoms both daytime and nighttime.  She has tried bracing but does not tolerate well and has breakthrough symptoms in the brace.  Exam shows positive Tinel's over course of median nerve to left wrist.  Positive dry compression test and Phalen's maneuver.  She has functional issues during the day like dropping things.  We talked about operative and nonoperative strategies.  She has had this performed the right side.  Her symptoms seem identical.  She did great with her right-sided carpal tunnel release many years ago.  After full discussion regarding risks benefits and alternatives the patient elects to forego any additional nonoperative measures  in favor of left carpal tunnel release.  Plan for wide-awake approach to anesthesia.

## 2024-06-05 ENCOUNTER — LAB (OUTPATIENT)
Dept: LAB | Facility: LAB | Age: 79
End: 2024-06-05
Payer: COMMERCIAL

## 2024-06-05 DIAGNOSIS — E03.9 HYPOTHYROIDISM, UNSPECIFIED: ICD-10-CM

## 2024-06-05 DIAGNOSIS — E55.9 VITAMIN D DEFICIENCY, UNSPECIFIED: ICD-10-CM

## 2024-06-05 DIAGNOSIS — E87.1 HYPO-OSMOLALITY AND HYPONATREMIA: ICD-10-CM

## 2024-06-05 LAB
25(OH)D3 SERPL-MCNC: 44 NG/ML (ref 30–100)
ALBUMIN SERPL BCP-MCNC: 4 G/DL (ref 3.4–5)
ALP SERPL-CCNC: 118 U/L (ref 33–136)
ALT SERPL W P-5'-P-CCNC: 13 U/L (ref 7–45)
ANION GAP SERPL CALC-SCNC: 12 MMOL/L (ref 10–20)
AST SERPL W P-5'-P-CCNC: 14 U/L (ref 9–39)
BILIRUB SERPL-MCNC: 1.2 MG/DL (ref 0–1.2)
BUN SERPL-MCNC: 25 MG/DL (ref 6–23)
CALCIUM SERPL-MCNC: 9.2 MG/DL (ref 8.6–10.3)
CHLORIDE SERPL-SCNC: 101 MMOL/L (ref 98–107)
CO2 SERPL-SCNC: 27 MMOL/L (ref 21–32)
CREAT SERPL-MCNC: 1.4 MG/DL (ref 0.5–1.05)
EGFRCR SERPLBLD CKD-EPI 2021: 39 ML/MIN/1.73M*2
GLUCOSE SERPL-MCNC: 97 MG/DL (ref 74–99)
OSMOLALITY SERPL: 290 MOSM/KG (ref 280–300)
POTASSIUM SERPL-SCNC: 4.4 MMOL/L (ref 3.5–5.3)
PROT SERPL-MCNC: 6.4 G/DL (ref 6.4–8.2)
SODIUM SERPL-SCNC: 136 MMOL/L (ref 136–145)
T4 FREE SERPL-MCNC: 1.5 NG/DL (ref 0.61–1.12)
TSH SERPL-ACNC: 2 MIU/L (ref 0.44–3.98)

## 2024-06-05 PROCEDURE — 84443 ASSAY THYROID STIM HORMONE: CPT

## 2024-06-05 PROCEDURE — 83930 ASSAY OF BLOOD OSMOLALITY: CPT

## 2024-06-05 PROCEDURE — 82306 VITAMIN D 25 HYDROXY: CPT

## 2024-06-05 PROCEDURE — 84439 ASSAY OF FREE THYROXINE: CPT

## 2024-06-05 PROCEDURE — 80053 COMPREHEN METABOLIC PANEL: CPT

## 2024-06-06 ENCOUNTER — TELEPHONE (OUTPATIENT)
Dept: ORTHOPEDIC SURGERY | Facility: CLINIC | Age: 79
End: 2024-06-06

## 2024-06-06 ENCOUNTER — PROCEDURE VISIT (OUTPATIENT)
Dept: OBSTETRICS AND GYNECOLOGY | Facility: CLINIC | Age: 79
End: 2024-06-06
Payer: MEDICARE

## 2024-06-06 VITALS — SYSTOLIC BLOOD PRESSURE: 118 MMHG | DIASTOLIC BLOOD PRESSURE: 70 MMHG | BODY MASS INDEX: 27.85 KG/M2 | WEIGHT: 145 LBS

## 2024-06-06 DIAGNOSIS — N39.41 URGE INCONTINENCE OF URINE: ICD-10-CM

## 2024-06-06 LAB
POC APPEARANCE, URINE: CLEAR
POC BILIRUBIN, URINE: NEGATIVE
POC BLOOD, URINE: NEGATIVE
POC COLOR, URINE: YELLOW
POC GLUCOSE, URINE: NEGATIVE MG/DL
POC KETONES, URINE: NEGATIVE MG/DL
POC LEUKOCYTES, URINE: ABNORMAL
POC NITRITE,URINE: NEGATIVE
POC PH, URINE: 5.5 PH
POC PROTEIN, URINE: NEGATIVE MG/DL
POC SPECIFIC GRAVITY, URINE: 1.01
POC UROBILINOGEN, URINE: 0.2 EU/DL

## 2024-06-06 PROCEDURE — 87086 URINE CULTURE/COLONY COUNT: CPT

## 2024-06-06 PROCEDURE — 81003 URINALYSIS AUTO W/O SCOPE: CPT | Performed by: OBSTETRICS & GYNECOLOGY

## 2024-06-06 PROCEDURE — 52287 CYSTOSCOPY CHEMODENERVATION: CPT | Performed by: OBSTETRICS & GYNECOLOGY

## 2024-06-06 RX ORDER — LIDOCAINE HYDROCHLORIDE 20 MG/ML
1 JELLY TOPICAL ONCE
Status: COMPLETED | OUTPATIENT
Start: 2024-06-06 | End: 2024-06-06

## 2024-06-06 RX ORDER — DOXYCYCLINE HYCLATE 100 MG
100 TABLET ORAL ONCE
Status: COMPLETED | OUTPATIENT
Start: 2024-06-06 | End: 2024-06-06

## 2024-06-06 RX ADMIN — LIDOCAINE HYDROCHLORIDE 1 APPLICATION: 20 JELLY TOPICAL at 10:09

## 2024-06-06 RX ADMIN — Medication 100 MG: at 10:10

## 2024-06-06 ASSESSMENT — PATIENT HEALTH QUESTIONNAIRE - PHQ9
1. LITTLE INTEREST OR PLEASURE IN DOING THINGS: NOT AT ALL
2. FEELING DOWN, DEPRESSED OR HOPELESS: NOT AT ALL
SUM OF ALL RESPONSES TO PHQ9 QUESTIONS 1 & 2: 0

## 2024-06-06 NOTE — TELEPHONE ENCOUNTER
Called patient to schedule recommended surgery with Dr. Wallis, call went to voicemail at which time a message was left. A [employer] message was also sent at this time.

## 2024-06-07 LAB — BACTERIA UR CULT: NORMAL

## 2024-06-11 PROBLEM — G56.02 CARPAL TUNNEL SYNDROME, LEFT UPPER LIMB: Status: ACTIVE | Noted: 2024-06-11

## 2024-06-17 NOTE — DISCHARGE INSTRUCTIONS
Medication given may have significant effects after discharge. Therefore on the day of surgery:  1) You must be accompanied by a responsible adult upon discharge and for 24 hours after surgery.  Do not drive a motor vehicle, operate machinery, power tools or appliance, drink alcoholic beverages, or make critical decisions for 24 hours.  2) Be aware of dizziness, which may cause a fall. Change positions slowly.  3) Eating: you may resume your regular diet but it is better to increase intake slowly with mild foods and working up to your regular diet. No greasy, fried or spicy foods today.  4) Nausea/Vomiting: Nausea and vomiting may occur as you become more active or begin to increase food intake. If this should happen, decrease activity and return to liquids. If the problem persists, call your surgeon  5) Pain: Your surgeon may have given you a prescription for pain medication. Take pain medication with food as prescribed. Pain medication may cause constipation, so drink plenty of fluids. If your pain medication does not provide adequate relief, call your surgeon  6) Urinating: Notify your surgeon if you have not urinated within 12 hours after discharge  7) Ice: Apply ice to operative site for 20 min 5-6 times a day or use Polar care as instructed  8) Dressing:   [x]  Remove dressing in 3 Days   [x]  Leave open to air or apply simple Band-Aid after initial dressing is taken off and incision is dry. (If Steri-Strips are applied, leave them in place.)   [x]  No baths, hots tubs, pools, or submerging in fresh water sources. Okay to begin showering and normal hand washing after dressing removal.     []  Leave dressing in place. Keep dressing/ incision clean and dry.      9) Activity:    Shoulder/ Elbow/Hand:   [x]  Elevate extremity    []  Sling   [] At all times (except for exercises and showering)  [] As needed only for comfort   [] Begin daily motion exercises out of sling as instructed   [x]  Bend and flex  fingers/wrist/elbow frequently   [] Non-weight bearing/No lifting/gripping/squeezing to the surgical limb   [x] No lifting greater than 1 lb until follow-up visit      10) Begin physical therapy if advised by your physician:   [] Before returning to see you doctor    [x] Will discuss possible need at follow-up visit   [] Will be paired with your follow-up visit in [place]    11) Call your doctor at [phone] for an appointment (or follow up as scheduled)    Contact [facility] office if  Increased redness, swelling, drainage of any kind, and/or pain to surgery site.  As well as new onset fevers and or chills.  These could signify an infection.  Calf or thigh tenderness to touch as well as increased swelling or redness.  This could signify a clot formation.  Numbness or tingling to an area around the incision site or below the incision site (toes). Or if the operative extremity becomes cold, blue.  Any rash appears, increased  or new onset nausea/vomiting occur.  This may indicate a reaction to a medication.  Temp is 38.5 C (101F)  12) If you have any concerns or questions, please call [facility] surgeon on call. The 24- hour phone is [phone]  13) If you are unable to contact your surgeon, in an emergency situation, go to the nearest hospital  Nerve Blocks    Why is this procedure done?  Nerve blocks can help to manage pain. By giving you a drug into an exact group of nerves, your doctor may be able to block pain to a specific part of your body. Some nerve blocks are used after surgery, especially if you have had an abdominal surgery. Nerve blocks are used before surgery to lessen the need for opioid drugs during and after surgery.  There are a few kinds of nerve blocks. Some nerve blocks are used to:  Treat pain. These may have a pain drug and a drug to help with swelling.  Find where your pain is coming from. This kind of nerve block will have a pain drug that lasts for only a  certain amount of time.  See if another kind of treatment like surgery will help your pain.  Prevent pain during or after a procedure.  Help you avoid surgery.  Give relief of pain during and after surgery.  Lessen the use of opioid drugs needed for pain after surgery.  Block the pain in an area of the body during surgery as anesthesia.  What will the results be?  The nerve block may help to treat or ease your pain. The area may be numb. You may have some pain relief right away. You may be able to use fewer pain medicines after surgery. It also may be easier for you to move around after surgery. Some nerve blocks go away within a few hours. Others give you pain relief for a day or so to a few months or longer. Some nerve blocks can take a few days to work fully.  What happens before the procedure?  Your doctor will ask you about your health history. Talk to the doctor about:  All the drugs you are taking. Be sure to include all prescription, over the counter, and herbal supplements. Tell the doctor if you have any drug allergy. Bring a list of drugs you take with you.  Any bleeding problems. Be sure to tell your doctor if you are taking any drugs that may cause bleeding. Some of these are warfarin, rivaroxaban, apixaban, ticagrelor, clopidogrel, ibuprofen, naproxen, or aspirin. Certain vitamins and herbs, such as garlic and fish oil, may also add to the risk for bleeding. You may need to stop these drugs as well. Talk to your doctor about them.  What happens during the procedure?  Sometimes, the doctor will give you a special drug to make you sleepy for the nerve block. Other times, you are completely awake. You may also have a nerve block as a part of your surgery.  The doctor will position you in a way to give them easy access to where you will be having the nerve block.  The doctor will clean the area and give you a local numbing drug. The doctor will use a long thin needle to give you the nerve block. Often, the  doctor will use a special x-ray, ultrasound, or CT scan to make sure the needle is in the right place. The doctor will inject the drug close to the nerve that is causing your pain. Sometimes they inject the drug in an area that will block pain from a few nerves.  The doctor will take out the needle and place a clean bandage on your skin.  Sometimes, the doctor may leave a catheter in place to deliver medicine over 1 to 2 days. You may have to return to your doctor's office to have the catheter removed.  The procedure takes 15 to 30 minutes.  What happens after the procedure?  If you are not having surgery, you will be able to go home the same day. You may be asked to rest for 15 to 30 minutes. If the doctor gives you a special drug to make you sleepy for the procedure, you will need someone to drive you home.  What care is needed at home?  You will be allowed to shower or take a bath later that same day unless you have a catheter still in place.  You may need other medicines to help with pain as your nerve block wears off.  What follow-up care is needed?  As your body absorbs the drugs, your pain may come back. Talk to your doctor about if you need another nerve block and how often you can have a nerve block.  What problems could happen?  Infection  Bleeding or bruising  Pain at the injection site  Raised blood sugar  Rash  Itching  Numbness  Nerve injury  Allergic reaction  Puncture or laceration of an organ like the liver, spleen. or bowel  Numbing medicine injected into a blood vessel  Where can I learn more?  American Society of Regional Anesthesia  https://www.manuel.com/patient-information/regional-anesthesia  NHS  https://www.nhs.uk/conditions/epidural/  NHS  https://www.nhs.uk/conditions/local-anaesthesia/  [employer] Society [place]  http://www.radiologyinfo.org/en/info.cfm?pg=nerveblock  Last Reviewed Date  2020-04-22

## 2024-06-18 NOTE — PREPROCEDURE INSTRUCTIONS
Patient and nurse discussed pre-operative instructions of the location of procedure, NPO status, home medications, and what to aspect after procedure.  Patient is aware to not smoke nicotine products, drink alcohol, or do any drugs within 24hrs of procedure.  Not to bring any valuables and to not wear any metal, jewelry, piercing's or beauty products for surgery.   Informed about the call the business day prior to procedure that will be give the exact time of arrival on the day of surgery, between 2PM-4PM.  Any questions call the surgeons office, and any questions about Pre-Admission Testing call [phone]

## 2024-06-19 NOTE — PROGRESS NOTES
"GYN PROGRESS NOTE          Chief complaint: Urge incontinence    HPI:  Patient answers are not available for this visit.  HPI       botox injections     Additional comments: Est pt  Chaperone Hilda/ Student             Comments    Katt is here today as an EST patient. She is a 78 yo patient here for a Cystoscopy with Intradetrusor Botox.  Her last visit was  12/5/2023  The procedure was explained by Hilda GARCIA and Dr. Juarez  Consent was signed.  The patient had no questions or concerns at this time.  Doxycyline 100mg PO was given per office protocol.  See MAR  Time out was completed.  Botox 100 Units \"office supply\"  I/O UA obtained see results.   LOT [id number]  EXP  08/2026  Discharge instructions were reviewed.  The patient did not have any questions or concerns at this time. A follow up was scheduled.          Last edited by Hilda Reynolds MA on 6/6/2024  9:35 AM.            ROS:  GEN - no fevers or chills  RESP - no SOB or cough  GYN - see HPI      HISTORY:  Past Medical History:   Diagnosis Date    A-fib (Multi)     Anesthesia of skin 11/17/2020    Numbness and tingling    Basal cell carcinoma     Body mass index (BMI) 31.0-31.9, adult 01/07/2022    BMI 31.0-31.9,adult    Chronic kidney disease     STAGE 3    Coronary artery disease     Fractures     GERD (gastroesophageal reflux disease)     Hearing aid worn     Heart valve disease     HL (hearing loss)     Hyperlipidemia     Hypertension     Hypothyroidism     Other general symptoms and signs 11/17/2020    Eye, ear, nose, and throat symptom    Other specified counseling 01/07/2022    Encounter for medication counseling    Overactive bladder     Overweight 01/07/2022    Overweight    Pain in unspecified knee     Knee pain    Person consulting for explanation of examination or test findings 01/07/2022    Encounter to discuss test results    Personal history of (healed) traumatic fracture     History of fracture of foot    Personal history of other complications " of pregnancy, childbirth and the puerperium     History of spontaneous     Personal history of other diseases of the circulatory system     History of hypertension    Personal history of other diseases of the circulatory system 2020    History of cardiac disorder    Personal history of other diseases of the digestive system     History of hiatal hernia    Personal history of other diseases of the musculoskeletal system and connective tissue 2020    History of arthritis    Personal history of other diseases of the respiratory system 2021    History of sinusitis    Personal history of other endocrine, nutritional and metabolic disease     History of hyperlipidemia    Personal history of other medical treatment 2015    History of screening mammography    Personal history of other specified conditions 2021    History of dizziness    Unspecified visual loss 2020    Vision problems     Past Surgical History:   Procedure Laterality Date    BLADDER SURGERY      botox injection    CARDIAC CATHETERIZATION  2021    Percutaneous transluminal coronary angioplasty    CARPAL TUNNEL RELEASE Right     CATARACT EXTRACTION Bilateral 2014    Cataract Surgery    CHOLECYSTECTOMY  2014    Cholecystectomy    COLONOSCOPY  2021    DILATION AND CURETTAGE OF UTERUS  2014    Dilation And Curettage    ESOPHAGOGASTRODUODENOSCOPY  2022    HIATAL HERNIA REPAIR      PARTIAL KNEE ARTHROPLASTY Left 2021    SKIN SURGERY  2014    Shaving Of Lesion Mohs' Technique- Face    STAPEDES SURGERY Bilateral     TONSILLECTOMY  2020    TOTAL KNEE ARTHROPLASTY Right 2014    Knee Replacement    TUBAL LIGATION      WISDOM TOOTH EXTRACTION       Social History     Socioeconomic History    Marital status:      Spouse name: Not on file    Number of children: Not on file    Years of education: Not on file    Highest education level: Not on file   Occupational  History    Not on file   Tobacco Use    Smoking status: Never    Smokeless tobacco: Never   Vaping Use    Vaping status: Never Used   Substance and Sexual Activity    Alcohol use: Yes     Comment: occasional    Drug use: Never    Sexual activity: Defer     Birth control/protection: Post-menopausal   Other Topics Concern    Not on file   Social History Narrative    Not on file     Social Determinants of Health     Financial Resource Strain: Not on file   Food Insecurity: Not on file   Transportation Needs: Not on file   Physical Activity: Not on file   Stress: Not on file   Social Connections: Not on file   Intimate Partner Violence: Not on file   Housing Stability: Not on file     Family history is unknown by patient.       PHYSICAL EXAM:  /70   Wt 65.8 kg (145 lb)   LMP  (LMP Unknown)   BMI 27.85 kg/m²   Physical examination:  General: No distress  Neck: No masses  Respiratory: No respiratory distress  GYN: Normal vulvar skin normal clitoral marx and clitoris labia majora and minora normal pink vaginal mucosa  perianal area: without lesions      Operative procedure: Operative cystoscopy intra-detrusor Botox injection  Dx: Urinary urgency    The patient was taken to the procedure room after informed consent was obtained.  Timeout was performed, birth date, patient name and procedure all discussed with the patient. She was placed in lithotomy position, half blade speculum placed in the posterior vagina to expose the urethra.  The urethra was prepped with Betadine.  1% lidocaine jelly was introduced to the urethral orifice. operative cystoscopy was placed inside the bladder surfaces were surveyed bilateral reflux was noted from the ureter orifices. A urethral inspection was then performed upon removal of the cystoscope from the bladder.  The cystoscope was reintroduced with the needle and in a fan like projection 0.5ml aliquots ofdiluted Botox were introduced into the bladder muscle. Total Botox use was 100U  in approximately 10ml saline. Scant bleeding was noted from the urothelial tissue.  The scope was removed from the patient's and the speculum was removed the patient tolerated the procedure well no complications bleeding minimal patient voiced understanding about postprocedure care.    IMPRESSION/PLAN:    Status post intradetrusor Botox    Plan follow-up in 6 months for repeat procedure        Arash Juarez MD

## 2024-06-20 ENCOUNTER — ANESTHESIA EVENT (OUTPATIENT)
Dept: OPERATING ROOM | Facility: HOSPITAL | Age: 79
End: 2024-06-20
Payer: MEDICARE

## 2024-06-20 RX ORDER — HYDROCODONE BITARTRATE AND ACETAMINOPHEN 5; 325 MG/1; MG/1
1 TABLET ORAL EVERY 8 HOURS PRN
Qty: 6 TABLET | Refills: 0 | Status: SHIPPED | OUTPATIENT
Start: 2024-06-20 | End: 2024-06-22

## 2024-06-21 ENCOUNTER — TELEPHONE (OUTPATIENT)
Dept: ORTHOPEDIC SURGERY | Facility: CLINIC | Age: 79
End: 2024-06-21

## 2024-06-21 ENCOUNTER — ANESTHESIA (OUTPATIENT)
Dept: OPERATING ROOM | Facility: HOSPITAL | Age: 79
End: 2024-06-21
Payer: MEDICARE

## 2024-06-21 ENCOUNTER — HOSPITAL ENCOUNTER (OUTPATIENT)
Facility: HOSPITAL | Age: 79
Setting detail: OUTPATIENT SURGERY
Discharge: HOME | End: 2024-06-21
Attending: ORTHOPAEDIC SURGERY | Admitting: ORTHOPAEDIC SURGERY
Payer: MEDICARE

## 2024-06-21 VITALS
SYSTOLIC BLOOD PRESSURE: 135 MMHG | RESPIRATION RATE: 16 BRPM | BODY MASS INDEX: 26.89 KG/M2 | OXYGEN SATURATION: 95 % | HEART RATE: 68 BPM | DIASTOLIC BLOOD PRESSURE: 62 MMHG | TEMPERATURE: 98.1 F | HEIGHT: 61 IN | WEIGHT: 142.42 LBS

## 2024-06-21 DIAGNOSIS — G89.18 POST-OP PAIN: Primary | ICD-10-CM

## 2024-06-21 DIAGNOSIS — G56.02 CARPAL TUNNEL SYNDROME, LEFT UPPER LIMB: ICD-10-CM

## 2024-06-21 PROCEDURE — 7100000010 HC PHASE TWO TIME - EACH INCREMENTAL 1 MINUTE: Performed by: ORTHOPAEDIC SURGERY

## 2024-06-21 PROCEDURE — 2500000004 HC RX 250 GENERAL PHARMACY W/ HCPCS (ALT 636 FOR OP/ED): Performed by: PHYSICIAN ASSISTANT

## 2024-06-21 PROCEDURE — 3700000001 HC GENERAL ANESTHESIA TIME - INITIAL BASE CHARGE: Performed by: ORTHOPAEDIC SURGERY

## 2024-06-21 PROCEDURE — 2500000004 HC RX 250 GENERAL PHARMACY W/ HCPCS (ALT 636 FOR OP/ED): Performed by: ORTHOPAEDIC SURGERY

## 2024-06-21 PROCEDURE — 3700000002 HC GENERAL ANESTHESIA TIME - EACH INCREMENTAL 1 MINUTE: Performed by: ORTHOPAEDIC SURGERY

## 2024-06-21 PROCEDURE — 7100000009 HC PHASE TWO TIME - INITIAL BASE CHARGE: Performed by: ORTHOPAEDIC SURGERY

## 2024-06-21 PROCEDURE — A4217 STERILE WATER/SALINE, 500 ML: HCPCS | Performed by: ORTHOPAEDIC SURGERY

## 2024-06-21 PROCEDURE — 3600000008 HC OR TIME - EACH INCREMENTAL 1 MINUTE - PROCEDURE LEVEL THREE: Performed by: ORTHOPAEDIC SURGERY

## 2024-06-21 PROCEDURE — 2500000004 HC RX 250 GENERAL PHARMACY W/ HCPCS (ALT 636 FOR OP/ED): Performed by: STUDENT IN AN ORGANIZED HEALTH CARE EDUCATION/TRAINING PROGRAM

## 2024-06-21 PROCEDURE — 3600000003 HC OR TIME - INITIAL BASE CHARGE - PROCEDURE LEVEL THREE: Performed by: ORTHOPAEDIC SURGERY

## 2024-06-21 PROCEDURE — 64721 CARPAL TUNNEL SURGERY: CPT | Performed by: ORTHOPAEDIC SURGERY

## 2024-06-21 PROCEDURE — 2500000005 HC RX 250 GENERAL PHARMACY W/O HCPCS: Performed by: STUDENT IN AN ORGANIZED HEALTH CARE EDUCATION/TRAINING PROGRAM

## 2024-06-21 RX ORDER — SODIUM CHLORIDE, SODIUM LACTATE, POTASSIUM CHLORIDE, CALCIUM CHLORIDE 600; 310; 30; 20 MG/100ML; MG/100ML; MG/100ML; MG/100ML
100 INJECTION, SOLUTION INTRAVENOUS CONTINUOUS
Status: DISCONTINUED | OUTPATIENT
Start: 2024-06-21 | End: 2024-06-21 | Stop reason: HOSPADM

## 2024-06-21 RX ORDER — LISINOPRIL 20 MG/1
20 TABLET ORAL DAILY
COMMUNITY

## 2024-06-21 RX ORDER — FUROSEMIDE 20 MG/1
20 TABLET ORAL DAILY
COMMUNITY

## 2024-06-21 RX ORDER — SODIUM CHLORIDE 0.9 G/100ML
IRRIGANT IRRIGATION AS NEEDED
Status: DISCONTINUED | OUTPATIENT
Start: 2024-06-21 | End: 2024-06-21 | Stop reason: HOSPADM

## 2024-06-21 ASSESSMENT — PAIN - FUNCTIONAL ASSESSMENT
PAIN_FUNCTIONAL_ASSESSMENT: 0-10
PAIN_FUNCTIONAL_ASSESSMENT: 0-10

## 2024-06-21 ASSESSMENT — PAIN DESCRIPTION - DESCRIPTORS: DESCRIPTORS: NUMBNESS

## 2024-06-21 ASSESSMENT — COLUMBIA-SUICIDE SEVERITY RATING SCALE - C-SSRS
6. HAVE YOU EVER DONE ANYTHING, STARTED TO DO ANYTHING, OR PREPARED TO DO ANYTHING TO END YOUR LIFE?: NO
1. IN THE PAST MONTH, HAVE YOU WISHED YOU WERE DEAD OR WISHED YOU COULD GO TO SLEEP AND NOT WAKE UP?: NO
2. HAVE YOU ACTUALLY HAD ANY THOUGHTS OF KILLING YOURSELF?: NO

## 2024-06-21 ASSESSMENT — PAIN SCALES - GENERAL
PAINLEVEL_OUTOF10: 0 - NO PAIN
PAIN_LEVEL: 0
PAINLEVEL_OUTOF10: 0 - NO PAIN

## 2024-06-21 NOTE — ANESTHESIA PROCEDURE NOTES
Peripheral Block    Patient location during procedure: pre-op  Start time: 6/21/2024 7:00 AM  End time: 6/21/2024 7:05 AM  Reason for block: at surgeon's request  Staffing  Performed: attending   Authorized by: Tez Etienne DO    Performed by: Tez Etienne DO  Preanesthetic Checklist  Completed: patient identified, IV checked, site marked, risks and benefits discussed, surgical consent, monitors and equipment checked, pre-op evaluation and timeout performed   Timeout performed at:   Peripheral Block  Patient position: laying flat  Prep: alcohol swabs  Patient monitoring: heart rate, continuous pulse ox and cardiac monitor  Block type: other  Laterality: left  Injection technique: single-shot  Needle  Needle localization: anatomical landmarks  Assessment  Injection assessment: negative aspiration for heme, no paresthesia on injection and incremental injection  Heart rate change: no  Additional Notes  USED FOR ANESTHESIA NOT FOR POSTOPERATIVE PAIN. Patient identified in holding area.  Preop nurse present for assessment.  Surgery consent form verified.  Timeout performed with team members. Skin cleaned in standard sterile manner.  20 ml lidocaine and with epinephrine injected into the surgical site, the left wrist.  Appropriate anesthesia obtained.  Patient tolerated the procedure well.    Tez Etienne DO

## 2024-06-21 NOTE — TELEPHONE ENCOUNTER
Patient had CTR today , said she was getting Vicodin sent over  , pharmacy doesn't have . Dont see anything in the chart . Xin lyman [facility]

## 2024-06-21 NOTE — TELEPHONE ENCOUNTER
PATIENT CALLED SHE STATED SHE HAD SURGERY TODAY AND SHE WENT TO [facility] ON [facility] TO GET HER PAIN MEDICATION AND NOTHING WAS THERE. SHE CAN BE REACHED [phone]

## 2024-06-21 NOTE — ANESTHESIA POSTPROCEDURE EVALUATION
Patient: Katt Jacobson    Procedure Summary       Date: 06/21/24 Room / Location: [facility] / [facility]    Anesthesia Start: 0730 Anesthesia Stop:     Procedure: LEFT CARPAL TUNNEL RELEASE (Left: Wrist) Diagnosis:       Carpal tunnel syndrome, left upper limb      (Carpal tunnel syndrome, left upper limb [G56.02])    Surgeons: Power Wallis DO Responsible Provider: COMFORT Her    Anesthesia Type: MAC, regional ASA Status: 3            Anesthesia Type: MAC, regional    Vitals Value Taken Time   /57 06/21/24 0742   Temp 36.0 06/21/24 0742   Pulse 66 06/21/24 0742   Resp 16 06/21/24 0742   SpO2 98 06/21/24 0742       Anesthesia Post Evaluation    Patient location during evaluation: bedside  Patient participation: complete - patient participated  Level of consciousness: awake and alert  Pain score: 0  Pain management: adequate  Airway patency: patent  Cardiovascular status: hemodynamically stable  Respiratory status: nonlabored ventilation, spontaneous ventilation and room air  Hydration status: acceptable  Postoperative Nausea and Vomiting: none        There were no known notable events for this encounter.

## 2024-06-21 NOTE — OP NOTE
LEFT CARPAL TUNNEL RELEASE (L) Operative Note     Date: 2024  OR Location: [facility]    Name: Katt Jacobson, : 1945, Age: 79 y.o., MRN: [id number], Sex: female        Preoperative diagnosis: Left carpal tunnel syndrome    Postoperative diagnosis: Left carpal tunnel syndrome    Procedure planned: Left carpal tunnel release    Procedure performed: Left carpal tunnel release    Surgeon: Power Wallis D.O.    Assistant: NING Younger  The physician assistant was present to the entire case. Given the nature of the disease process and the procedure to be performed a skilled surgical assistant was necessary during the case. The assistant was necessary in order to hold retractors and directly assist in the operation. A certified scrub tech was at the back table managing instruments and supplies for the surgical case.    Anesthesia: Local field block using lidocaine with epinephrine solution and monitored by the anesthesia team    Estimated blood loss: Less than 10 mL    Drains: None    Tourniquet: None    Specimens: None    Implants: None    Indications: The patient presented to the office with subjective symptoms, physical examination, and EMG/ nerve conduction study test consistent with left carpal tunnel syndrome.  The patient had failure of nonoperative treatment strategies.  After full discussion regarding risks benefits and alternatives the patient elected to forego any additional nonoperative management in favor of left carpal tunnel release.  Informed consent was signed and placed in the chart.    Complications: None noted at the time of surgery    Description of operation: The patient was taken to the operative suite and placed in the supine position on the operating table.  A timeout was performed and the left carpal tunnel was confirmed to be the operative site.  The patient was carefully positioned on the table in such a fashion as to pad all bony prominences and peripheral nerves. The patient  was then prepped and draped in the normal sterile fashion.  After prepping and draping a longitudinal incision was marked out directly overlying the transverse carpal ligament in line with the ring finger ray.  Forceps were used to gently grasp and pinch the skin in the zone of intended surgical dissection to check for adequate anesthesia.  After confirming adequate anesthesia the 15 blade was used to incise skin and dissect down to the subcutaneous plane.  The palmar aponeurosis was divided in line with the incision to expose the transverse carpal ligament.  The transverse carpal ligament was then meticulously divided under direct visualization, working from distal to proximal, taking care to avoid iatrogenic injury to the underlying contents of the carpal tunnel.  The tenotomy scissors were used to release the most proximal fibers of the transverse carpal ligament along with the most distal fibers of the antebrachial fascia.  This was done under direct visualization.  The distal release of the transverse carpal ligament was carried to the level of the fat change.  Direct inspection and digital palpation confirmed complete release of the carpal tunnel.  Copious irrigation was performed.  Interrupted nylon stitches were used to close the skin.  A bulky nonadherent dressing was placed.  The patient was then allowed to head to recovery in stable condition.  Overall the patient tolerated the procedure well.    Disposition: Stable to PACU          Power Wallis  Phone Number: [phone]

## 2024-06-21 NOTE — ANESTHESIA PREPROCEDURE EVALUATION
Patient: Katt Jacobson    Procedure Information       Date/Time: 06/21/24 0730    Procedure: LEFT CARPAL TUNNEL RELEASE (Left: Wrist) - WIDE AWAKE BLOCK DONE BY ANESTHESIA TEAM    Location: [facility] / [facility]    Surgeons: Power Wallis, DO            Relevant Problems   Cardiac   (+) Coronary artery disease involving native coronary artery of native heart without angina pectoris   (+) Essential hypertension   (+) Mixed hyperlipidemia   (+) PVC (premature ventricular contraction)   (+) Paroxysmal ventricular tachycardia (Multi)   (+) Tricuspid regurgitation      Neuro   (+) Carpal tunnel syndrome, left upper limb      GI   (+) Hiatal hernia with GERD      Endocrine   (+) Hypothyroidism      Musculoskeletal   (+) Carpal tunnel syndrome, left upper limb   (+) Cervical spine degeneration       Clinical information reviewed:   Tobacco  Allergies  Meds   Med Hx  Surg Hx   Fam Hx  Soc Hx        NPO Detail:  NPO/Void Status  Carbohydrate Drink Given Prior to Surgery? : N  Date of Last Liquid: 06/20/24  Time of Last Liquid: 2300  Date of Last Solid: 06/20/24  Time of Last Solid: 2000  Last Intake Type: Clear fluids  Time of Last Void: 0500         Physical Exam    Airway  Mallampati: II     Cardiovascular   Rhythm: regular  Rate: normal     Dental    Pulmonary - normal exam     Abdominal - normal exam             Anesthesia Plan    History of general anesthesia?: yes  History of complications of general anesthesia?: no    ASA 3     MAC and regional     intravenous induction   Postoperative administration of opioids is intended.  Anesthetic plan and risks discussed with patient.

## 2024-06-21 NOTE — TELEPHONE ENCOUNTER
Called pharmacy to see if the prescription was received.  They said the patient picked the script up.

## 2024-06-24 NOTE — TELEPHONE ENCOUNTER
I called the pharmacy on Friday afternoon and they told me the patient came and picked up this medication.

## 2024-07-01 ENCOUNTER — OFFICE VISIT (OUTPATIENT)
Dept: ORTHOPEDIC SURGERY | Facility: CLINIC | Age: 79
End: 2024-07-01
Payer: MEDICARE

## 2024-07-01 VITALS — HEIGHT: 61 IN | BODY MASS INDEX: 27.19 KG/M2 | WEIGHT: 144 LBS

## 2024-07-01 DIAGNOSIS — G56.02 CARPAL TUNNEL SYNDROME OF LEFT WRIST: Primary | ICD-10-CM

## 2024-07-01 PROCEDURE — 1157F ADVNC CARE PLAN IN RCRD: CPT | Performed by: ORTHOPAEDIC SURGERY

## 2024-07-01 PROCEDURE — 1036F TOBACCO NON-USER: CPT | Performed by: ORTHOPAEDIC SURGERY

## 2024-07-01 PROCEDURE — 99024 POSTOP FOLLOW-UP VISIT: CPT | Performed by: ORTHOPAEDIC SURGERY

## 2024-07-01 ASSESSMENT — PATIENT HEALTH QUESTIONNAIRE - PHQ9
2. FEELING DOWN, DEPRESSED OR HOPELESS: NOT AT ALL
SUM OF ALL RESPONSES TO PHQ9 QUESTIONS 1 AND 2: 0
1. LITTLE INTEREST OR PLEASURE IN DOING THINGS: NOT AT ALL

## 2024-07-01 ASSESSMENT — PAIN - FUNCTIONAL ASSESSMENT: PAIN_FUNCTIONAL_ASSESSMENT: NO/DENIES PAIN

## 2024-07-01 NOTE — PROGRESS NOTES
7/1/2024    Chief Complaint   Patient presents with    Left Wrist - Post-op     Lt CTR  DOS: 6/21/24       History of Present Illness:  Patient Katt Jacobson , 79 y.o. female, presents today, 7/1/2024, for evaluation of left hand  carpal tunnel release, 2 weeks post-op .  Numbness and tingling has nearly completely resolved the interim since surgery.  She only has minimal symptoms to the tip of the index finger and thumb.  This continues to get better with time.  Denies any fever chills or constitutional symptoms.  She is very happy with her outcome so far.       Review of Systems:   GENERAL: Negative  GI: Negative  MUSCULOSKELETAL: See HPI  SKIN: Negative  NEURO:  Negative     Physical Exam:  GENERAL:  Alert and oriented to person, place, and time.  No acute distress and breathing comfortably; pleasant and cooperative with the examination.  HEENT:  Head is normocephalic and atraumatic.  NECK:  Supple, no visible swelling.  CARDIOVASCULAR:  No palpable tachycardia.  LUNGS:  No audible wheezing or labored breathing.  ABDOMEN:  Nondistended.  Extremities: The surgical incision is clean, dry, intact, and appears to be healing well.  No active bleeding, erythema, warmth, drainage, or signs of infection.  Appropriate functional ROM demonstrated with flexion/extension of the digits, and flexion/extension/pronosupination of the wrist.     Imaging/Test Results:  None.       Assessment:  Left carpal tunnel release, 2 weeks postop doing well with near complete resolution of numbness and tingling.     Plan:  Sutures were removed in the office today.  The patient can begin to weight bear as tolerated.  We discussed and reviewed home exercise program for range of motion recovery, scar massage, and desensitization techniques.  They can return to activities as tolerated.  The patient will follow-up with our office on an as needed basis.  All patient questions answered at today's visit.    Zohra Barcenas PA-C

## 2024-07-12 ENCOUNTER — APPOINTMENT (OUTPATIENT)
Dept: CARDIOLOGY | Facility: CLINIC | Age: 79
End: 2024-07-12
Payer: MEDICARE

## 2024-07-12 VITALS
HEART RATE: 56 BPM | SYSTOLIC BLOOD PRESSURE: 132 MMHG | BODY MASS INDEX: 27 KG/M2 | HEIGHT: 61 IN | WEIGHT: 143 LBS | DIASTOLIC BLOOD PRESSURE: 66 MMHG

## 2024-07-12 DIAGNOSIS — I10 ESSENTIAL HYPERTENSION: ICD-10-CM

## 2024-07-12 DIAGNOSIS — Z87.891 FORMER SMOKER: ICD-10-CM

## 2024-07-12 DIAGNOSIS — Z79.899 HIGH RISK MEDICATION USE: ICD-10-CM

## 2024-07-12 DIAGNOSIS — I47.29 PAROXYSMAL VENTRICULAR TACHYCARDIA (MULTI): ICD-10-CM

## 2024-07-12 DIAGNOSIS — R00.1 BRADYCARDIA: ICD-10-CM

## 2024-07-12 PROCEDURE — 3075F SYST BP GE 130 - 139MM HG: CPT | Performed by: INTERNAL MEDICINE

## 2024-07-12 PROCEDURE — 1036F TOBACCO NON-USER: CPT | Performed by: INTERNAL MEDICINE

## 2024-07-12 PROCEDURE — 1157F ADVNC CARE PLAN IN RCRD: CPT | Performed by: INTERNAL MEDICINE

## 2024-07-12 PROCEDURE — 99213 OFFICE O/P EST LOW 20 MIN: CPT | Performed by: INTERNAL MEDICINE

## 2024-07-12 PROCEDURE — 93000 ELECTROCARDIOGRAM COMPLETE: CPT | Performed by: INTERNAL MEDICINE

## 2024-07-12 PROCEDURE — 3078F DIAST BP <80 MM HG: CPT | Performed by: INTERNAL MEDICINE

## 2024-07-12 PROCEDURE — 1159F MED LIST DOCD IN RCRD: CPT | Performed by: INTERNAL MEDICINE

## 2024-07-12 ASSESSMENT — ENCOUNTER SYMPTOMS
ORTHOPNEA: 0
IRREGULAR HEARTBEAT: 0
WHEEZING: 0
SYNCOPE: 0
PND: 0
SHORTNESS OF BREATH: 0
COUGH: 0
SNORING: 0
NEAR-SYNCOPE: 0
CARDIOVASCULAR NEGATIVE: 1
CLAUDICATION: 0

## 2024-07-12 NOTE — PROGRESS NOTES
"Chief Complaint:   Follow-up (6 month)     History Of Present Illness:    Katt Jacobson is a 79 y.o. female presenting with follow-up.  She denies any arrhythmia symptoms.  She denies any lightheadedness, near-syncope, palpitation, dizziness, or syncope.    She reports that her blood pressure had spiked up.  Endocrinology had added furosemide and potassium.  She inquires about follow-up with blood work.  We reviewed her last blood work from January 2024 and June 2024.    Last Recorded Vitals:  Vitals:    07/12/24 1041   BP: 132/66   BP Location: Right arm   Patient Position: Sitting   Pulse: 56   Weight: 64.9 kg (143 lb)   Height: 1.549 m (5' 1\")       Past Medical History:  She has a past medical history of A-fib (Multi), Anesthesia of skin (11/17/2020), Basal cell carcinoma, Body mass index (BMI) 31.0-31.9, adult (01/07/2022), Chronic kidney disease, Coronary artery disease, Fractures, GERD (gastroesophageal reflux disease), Hearing aid worn, Heart valve disease, HL (hearing loss), Hyperlipidemia, Hypertension, Hypothyroidism, Other general symptoms and signs (11/17/2020), Other specified counseling (01/07/2022), Overactive bladder, Overweight (01/07/2022), Pain in unspecified knee, Person consulting for explanation of examination or test findings (01/07/2022), Personal history of (healed) traumatic fracture, Personal history of other complications of pregnancy, childbirth and the puerperium, Personal history of other diseases of the circulatory system, Personal history of other diseases of the circulatory system (11/17/2020), Personal history of other diseases of the digestive system, Personal history of other diseases of the musculoskeletal system and connective tissue (11/17/2020), Personal history of other diseases of the respiratory system (12/06/2021), Personal history of other endocrine, nutritional and metabolic disease, Personal history of other medical treatment (07/28/2015), Personal history of " other specified conditions (12/06/2021), and Unspecified visual loss (11/17/2020).    Past Surgical History:  She has a past surgical history that includes Dilation and curettage of uterus (02/27/2014); Cholecystectomy (02/27/2014); Cataract extraction (Bilateral, 02/27/2014); Total knee arthroplasty (Right, 02/27/2014); Skin surgery (02/27/2014); Colonoscopy (12/06/2021); Cardiac catheterization (12/06/2021); Partial knee arthroplasty (Left, 12/06/2021); Esophagogastroduodenoscopy (07/27/2022); Tonsillectomy (11/17/2020); Hiatal hernia repair; Tubal ligation; Bladder surgery; [place] tooth extraction; Stapedes surgery (Bilateral); Carpal tunnel release (Right); and Carpal tunnel release (Left, 06/21/2024).      Social History:  She reports that she has never smoked. She has never used smokeless tobacco. She reports current alcohol use. She reports that she does not use drugs.    Family History:  Family History   Family history unknown: Yes        Allergies:  Patient has no known allergies.    Outpatient Medications:  Current Outpatient Medications   Medication Instructions    amiodarone (PACERONE) 100 mg, oral, Daily    atorvastatin (LIPITOR) 10 mg, oral, Daily    cholecalciferol (Vitamin D-3) 50 mcg (2,000 unit) capsule oral    estradiol (Estrace) 0.01 % (0.1 mg/gram) vaginal cream Apply a pea size amount to the vulva and rub in completely    fluocinonide (Lidex) 0.05 % external solution Apply to scalp night before using Ketoconazole Shampoo    fluticasone (Flonase) 50 mcg/actuation nasal spray     furosemide (LASIX) 20 mg, oral, Daily    ketoconazole (NIZOral) 2 % shampoo 1 Application, Topical, See admin instructions, Apply as directed by physician    levothyroxine (Synthroid, Levoxyl) 50 mcg tablet 1 tablet, oral, Daily    lisinopril 20 mg, oral, Daily    magnesium oxide (MAG-OX) 400 mg, oral, 2 times daily    metoprolol succinate XL (KAPSPARGO SPRINKLE) 25 mg, oral, Daily    mv-min/FA/vit K/lutein/zeaxant  (PRESERVISION AREDS 2 PLUS MV ORAL) 1 capsule, oral, 2 times daily    nitroglycerin (NITROSTAT) 0.4 mg, sublingual, Every 5 min PRN    pantoprazole (PROTONIX) 40 mg, oral, Daily    potassium chloride CR 10 mEq ER tablet 10 mEq, oral, Daily        Review of Systems   Constitutional: Negative for malaise/fatigue.   Cardiovascular: Negative.  Negative for claudication, cyanosis, irregular heartbeat, leg swelling, near-syncope, orthopnea, paroxysmal nocturnal dyspnea and syncope.   Respiratory:  Negative for cough, shortness of breath, snoring and wheezing.    All other systems reviewed and are negative.       Physical Exam:  Constitutional:       Appearance: Normal and healthy appearance. Well-developed and not in distress.   Neck:      Vascular: No JVR. JVD normal.   Pulmonary:      Effort: Pulmonary effort is normal.      Breath sounds: Normal breath sounds. No wheezing. No rhonchi. No rales.   Chest:      Chest wall: Not tender to palpatation.   Cardiovascular:      PMI at left midclavicular line. Bradycardia present. Regular rhythm. Normal S1. Normal S2.       Murmurs: There is no murmur.      No gallop.  No click. No rub.   Pulses:     Intact distal pulses.   Edema:     Peripheral edema absent.   Abdominal:      Tenderness: There is no abdominal tenderness.   Musculoskeletal: Normal range of motion.         General: No tenderness. Skin:     General: Skin is warm and dry.   Neurological:      General: No focal deficit present.      Mental Status: Alert and oriented to person, place and time.           Last Labs:  CBC -  Lab Results   Component Value Date    WBC 10.1 03/21/2024    HGB 13.0 03/21/2024    HCT 40.0 03/21/2024    MCV 91 03/21/2024     03/21/2024       CMP -  Lab Results   Component Value Date    CALCIUM 9.2 06/05/2024    PHOS 3.8 02/28/2024    PROT 6.4 06/05/2024    ALBUMIN 4.0 06/05/2024    AST 14 06/05/2024    ALT 13 06/05/2024    ALKPHOS 118 06/05/2024    BILITOT 1.2 06/05/2024       LIPID  "PANEL -   No results found for: \"CHOL\", \"TRIG\", \"HDL\", \"CHHDL\", \"LDLF\", \"VLDL\", \"NHDL\"    RENAL FUNCTION PANEL -   Lab Results   Component Value Date    GLUCOSE 97 06/05/2024     06/05/2024    K 4.4 06/05/2024     06/05/2024    CO2 27 06/05/2024    ANIONGAP 12 06/05/2024    BUN 25 (H) 06/05/2024    CREATININE 1.40 (H) 06/05/2024    CALCIUM 9.2 06/05/2024    PHOS 3.8 02/28/2024    ALBUMIN 4.0 06/05/2024        Lab Results   Component Value Date    HGBA1C 6.1 (A) 10/17/2022       Last Cardiology Tests:  ECG:  ECG 12 Lead 01/12/2024    Today.  Sinus bradycardia.  Normal axis.  Corrected QT interval 440 ms.  Blood work from January 2024 in June 2024 reviewed.  Patient has follow-up with Dr. Smith for increased creatinine        Lab review: I have personally reviewed the laboratory result(s) see above    Assessment/Plan   Problem List Items Addressed This Visit             ICD-10-CM    Essential hypertension I10    Paroxysmal ventricular tachycardia (Multi) I47.29    High risk medication use Z79.899    Former smoker Z87.891    Adult BMI 27.0-27.9 kg/sq m Z68.27    Bradycardia R00.1     Ventricular tachycardia. Wide-complex tachycardia with no inducible arrhythmias by diagnostic EP testing in 2011. PVCs. Chronic. Stable and suppressed with amiodarone. Normal EP conduction at baseline and with isoproterenol by EP testing November 2011.  Reviewed medications.  Continue medications.  Discussed refills.  High-risk medication of amiodarone.  Reviewed last blood work.  History of orthostatic dizziness by history.  Resolved. Reinforced behavioral modification.   Hypothyroidism, on replacement therapy. Stable. Chronic. Follows with endocrinology.   Coronary artery disease , chronic. Stable. History of cath in 2011 with minimal 20 to 30 percent in-stent circumflex restenosis. Asymptomatic. Continue with medical therapy. Refills . Follows with Dr. Rice  Stress test in 2015 with no infarct and no ischemia. " Stable. Chronic. Follows with Dr. Rice.  Valvular heart disease with mild MR and TR, imparied relaxtion, and LVEF 60% by echo July 2021  Hypertension. Benign, chronic, better controlled.  Reviewed medications.  Reviewed last blood work.  Discussed refills.  Hyperlipidemia, chronic. Stable. On statin. Continue atorvastatin.  CKD stage III. Chronic. Stable. Reviewed blood work with patient.  Follows with renal  Hyponatremia.  Stable.  Follows with renal  Remote cholecystectomy  Overweight.        AHA recommendations for exercise, diet, and behavioral modification reviewed with pt.     Counseling over 50% visit performed.  The patient and I discussed the amiodarone screening, arrhythmia, ventricular tachycardia, ECG, PVCs, blood pressure, blood work results, indications for and types of medications, discussion if and what medication refills needed, American Heart Association lifestyle changes and behavioral modification discussed. All questions answered in detail. Patient appreciative of care.             Maddy Rivera MD

## 2024-07-12 NOTE — PATIENT INSTRUCTIONS
Follow up in 6 months with Sallie, and 1 year with Dr. Rivera  Continue same medications and treatments.   Patient educated on proper medication use.   Patient educated on risk factor modification.   Please bring any lab results from other providers / physicians to your next appointment.     Please bring all medicines, vitamins, and herbal supplements with you when you come to the office.     Prescriptions will not be filled unless you are compliant with your follow up appointments or have a follow up appointment scheduled as per instruction of your physician. Refills should be requested at the time of your visit.  ICLAUDIA LPN, AM SCRIBING FOR, AND IN THE PRESENCE OF DR. MEGHAN MD

## 2024-08-15 ENCOUNTER — APPOINTMENT (OUTPATIENT)
Dept: CARDIOLOGY | Facility: CLINIC | Age: 79
End: 2024-08-15
Payer: MEDICARE

## 2024-08-15 VITALS
BODY MASS INDEX: 26.49 KG/M2 | WEIGHT: 140.3 LBS | HEIGHT: 61 IN | SYSTOLIC BLOOD PRESSURE: 122 MMHG | HEART RATE: 60 BPM | DIASTOLIC BLOOD PRESSURE: 64 MMHG

## 2024-08-15 DIAGNOSIS — Z87.891 FORMER SMOKER: ICD-10-CM

## 2024-08-15 DIAGNOSIS — R00.0 WIDE-COMPLEX TACHYCARDIA: ICD-10-CM

## 2024-08-15 DIAGNOSIS — I25.10 CORONARY ARTERY DISEASE INVOLVING NATIVE CORONARY ARTERY OF NATIVE HEART WITHOUT ANGINA PECTORIS: ICD-10-CM

## 2024-08-15 DIAGNOSIS — N18.30 STAGE 3 CHRONIC KIDNEY DISEASE, UNSPECIFIED WHETHER STAGE 3A OR 3B CKD (MULTI): ICD-10-CM

## 2024-08-15 DIAGNOSIS — I10 ESSENTIAL HYPERTENSION: ICD-10-CM

## 2024-08-15 DIAGNOSIS — E78.2 MIXED HYPERLIPIDEMIA: ICD-10-CM

## 2024-08-15 PROCEDURE — 99214 OFFICE O/P EST MOD 30 MIN: CPT | Performed by: INTERNAL MEDICINE

## 2024-08-15 PROCEDURE — 1159F MED LIST DOCD IN RCRD: CPT | Performed by: INTERNAL MEDICINE

## 2024-08-15 PROCEDURE — 3078F DIAST BP <80 MM HG: CPT | Performed by: INTERNAL MEDICINE

## 2024-08-15 PROCEDURE — 1157F ADVNC CARE PLAN IN RCRD: CPT | Performed by: INTERNAL MEDICINE

## 2024-08-15 PROCEDURE — 1036F TOBACCO NON-USER: CPT | Performed by: INTERNAL MEDICINE

## 2024-08-15 PROCEDURE — 3074F SYST BP LT 130 MM HG: CPT | Performed by: INTERNAL MEDICINE

## 2024-08-15 RX ORDER — CLOPIDOGREL BISULFATE 75 MG/1
75 TABLET ORAL DAILY
Qty: 90 TABLET | Refills: 3 | Status: SHIPPED | OUTPATIENT
Start: 2024-08-15 | End: 2025-08-15

## 2024-08-15 ASSESSMENT — ENCOUNTER SYMPTOMS
IRREGULAR HEARTBEAT: 0
PND: 0
ORTHOPNEA: 0
SNORING: 0
COUGH: 0
SHORTNESS OF BREATH: 0
WHEEZING: 0
CLAUDICATION: 0
NEAR-SYNCOPE: 0
SYNCOPE: 0
CARDIOVASCULAR NEGATIVE: 1

## 2024-08-15 NOTE — PROGRESS NOTES
CARDIOLOGY OFFICE VISIT      CHIEF COMPLAINT      HISTORY OF PRESENT ILLNESS  The patient states that she has been feeling well.  She denies chest discomfort or symptoms of myocardial ischemia.  She denies dyspnea with activities.  She denies palpitations syncope.  She denies any problem with her medications.  She has not had her lipids checked for some time.  I told her we will put an order in lab when she has upcoming lab work by other physician in the future she can get her lipids checked also.      IMPRESSION:   1. Coronary artery disease, no angina.  2. Essential hypertension.  3. Mixed hyperlipidemia.  4. History of wide-complex tachycardia, being managed with  amiodarone.  5. Overweight  6. Chronic kidney disease, stage III,followed  by Dr. Smith.     Please excuse any errors in grammar or translation related to this dictation. Voice recognition software was utilized to prepare this document.         Past Medical History  Past Medical History:   Diagnosis Date    A-fib (Multi)     Anesthesia of skin 11/17/2020    Numbness and tingling    Basal cell carcinoma     Body mass index (BMI) 31.0-31.9, adult 01/07/2022    BMI 31.0-31.9,adult    Chronic kidney disease     STAGE 3    Coronary artery disease     Fractures     GERD (gastroesophageal reflux disease)     Hearing aid worn     Heart valve disease     HL (hearing loss)     Hyperlipidemia     Hypertension     Hypothyroidism     Other general symptoms and signs 11/17/2020    Eye, ear, nose, and throat symptom    Other specified counseling 01/07/2022    Encounter for medication counseling    Overactive bladder     Overweight 01/07/2022    Overweight    Pain in unspecified knee     Knee pain    Person consulting for explanation of examination or test findings 01/07/2022    Encounter to discuss test results    Personal history of (healed) traumatic fracture     History of fracture of foot    Personal history of other complications of pregnancy, childbirth and the  puerperium     History of spontaneous     Personal history of other diseases of the circulatory system     History of hypertension    Personal history of other diseases of the circulatory system 2020    History of cardiac disorder    Personal history of other diseases of the digestive system     History of hiatal hernia    Personal history of other diseases of the musculoskeletal system and connective tissue 2020    History of arthritis    Personal history of other diseases of the respiratory system 2021    History of sinusitis    Personal history of other endocrine, nutritional and metabolic disease     History of hyperlipidemia    Personal history of other medical treatment 2015    History of screening mammography    Personal history of other specified conditions 2021    History of dizziness    Unspecified visual loss 2020    Vision problems       Social History  Social History     Tobacco Use    Smoking status: Never    Smokeless tobacco: Never   Vaping Use    Vaping status: Never Used   Substance Use Topics    Alcohol use: Yes     Alcohol/week: 2.0 standard drinks of alcohol     Types: 2 Glasses of wine per week     Comment: Socially    Drug use: Never       Family History     Family History   Problem Relation Name Age of Onset    Heart disease Mother Lillian Myrick     Kidney disease Mother Lillian Myrick         Allergies:  No Known Allergies     Outpatient Medications:  Current Outpatient Medications   Medication Instructions    amiodarone (PACERONE) 100 mg, oral, Daily    atorvastatin (LIPITOR) 10 mg, oral, Daily    cholecalciferol (Vitamin D-3) 50 mcg (2,000 unit) capsule oral    estradiol (Estrace) 0.01 % (0.1 mg/gram) vaginal cream Apply a pea size amount to the vulva and rub in completely    fluocinonide (Lidex) 0.05 % external solution Apply to scalp night before using Ketoconazole Shampoo    fluticasone (Flonase) 50 mcg/actuation nasal spray     furosemide  (LASIX) 20 mg, oral, Daily    ketoconazole (NIZOral) 2 % shampoo 1 Application, Topical, See admin instructions, Apply as directed by physician    levothyroxine (Synthroid, Levoxyl) 50 mcg tablet 1 tablet, oral, Daily    lisinopril 20 mg, oral, Daily    magnesium oxide (MAG-OX) 400 mg, oral, 2 times daily    metoprolol succinate XL (KAPSPARGO SPRINKLE) 25 mg, oral, Daily    mv-min/FA/vit K/lutein/zeaxant (PRESERVISION AREDS 2 PLUS MV ORAL) 1 capsule, oral, 2 times daily    nitroglycerin (NITROSTAT) 0.4 mg, sublingual, Every 5 min PRN    pantoprazole (PROTONIX) 40 mg, oral, Daily    potassium chloride CR 10 mEq ER tablet 10 mEq, oral, Daily          REVIEW OF SYSTEMS  Review of Systems   Constitutional: Negative for malaise/fatigue.   Cardiovascular: Negative.  Negative for claudication, cyanosis, irregular heartbeat, leg swelling, near-syncope, orthopnea, paroxysmal nocturnal dyspnea and syncope.   Respiratory:  Negative for cough, shortness of breath, snoring and wheezing.    All other systems reviewed and are negative.        VITALS  Vitals:    08/15/24 1509   BP: 122/64   Pulse: 60       PHYSICAL EXAM  Constitutional:       Appearance: Normal and healthy appearance. Well-developed and not in distress.   Neck:      Vascular: No JVR. JVD normal.   Pulmonary:      Effort: Pulmonary effort is normal.      Breath sounds: Normal breath sounds. No wheezing. No rhonchi. No rales.   Chest:      Chest wall: Not tender to palpatation.   Cardiovascular:      PMI at left midclavicular line. Normal rate. Regular rhythm. Normal S1. Normal S2.       Murmurs: There is no murmur.      No gallop.  No click. No rub.   Pulses:     Intact distal pulses.   Edema:     Peripheral edema absent.   Abdominal:      General: Bowel sounds are normal.      Palpations: Abdomen is soft.      Tenderness: There is no abdominal tenderness.   Musculoskeletal: Normal range of motion.         General: No tenderness. Skin:     General: Skin is warm and  dry.   Neurological:      General: No focal deficit present.      Mental Status: Alert and oriented to person, place and time.           ASSESSMENT AND PLAN      [unfilled]

## 2024-08-15 NOTE — PATIENT INSTRUCTIONS
Please get your labs drawn for cholesterol, you will need to fast.  We will call with results.  BEGIN taking Plavix 75mg daily  Follow up in 1 year  Continue same medications and treatments.   Patient educated on proper medication use.   Patient educated on risk factor modification.   Please bring any lab results from other providers / physicians to your next appointment.     Please bring all medicines, vitamins, and herbal supplements with you when you come to the office.     Prescriptions will not be filled unless you are compliant with your follow up appointments or have a follow up appointment scheduled as per instruction of your physician. Refills should be requested at the time of your visit.    I, Eliane Aguilar LPN, am scribing for and in the presence of Dr. David Rice MD

## 2024-08-26 ENCOUNTER — LAB (OUTPATIENT)
Dept: LAB | Facility: LAB | Age: 79
End: 2024-08-26
Payer: COMMERCIAL

## 2024-08-26 DIAGNOSIS — E78.2 MIXED HYPERLIPIDEMIA: ICD-10-CM

## 2024-08-26 DIAGNOSIS — N25.81 SECONDARY HYPERPARATHYROIDISM OF RENAL ORIGIN (MULTI): ICD-10-CM

## 2024-08-26 DIAGNOSIS — R94.6 ABNORMAL RESULTS OF THYROID FUNCTION STUDIES: ICD-10-CM

## 2024-08-26 DIAGNOSIS — E03.9 HYPOTHYROIDISM, UNSPECIFIED: ICD-10-CM

## 2024-08-26 DIAGNOSIS — E55.9 VITAMIN D DEFICIENCY, UNSPECIFIED: Primary | ICD-10-CM

## 2024-08-26 DIAGNOSIS — I25.10 CORONARY ARTERY DISEASE INVOLVING NATIVE CORONARY ARTERY OF NATIVE HEART WITHOUT ANGINA PECTORIS: ICD-10-CM

## 2024-08-26 DIAGNOSIS — N18.31 CHRONIC KIDNEY DISEASE, STAGE 3A (MULTI): ICD-10-CM

## 2024-08-26 LAB
25(OH)D3 SERPL-MCNC: 51 NG/ML (ref 30–100)
ALBUMIN SERPL BCP-MCNC: 3.7 G/DL (ref 3.4–5)
ALP SERPL-CCNC: 119 U/L (ref 33–136)
ALT SERPL W P-5'-P-CCNC: 12 U/L (ref 7–45)
ANION GAP SERPL CALC-SCNC: 13 MMOL/L (ref 10–20)
AST SERPL W P-5'-P-CCNC: 15 U/L (ref 9–39)
BILIRUB SERPL-MCNC: 2 MG/DL (ref 0–1.2)
BUN SERPL-MCNC: 19 MG/DL (ref 6–23)
CALCIUM SERPL-MCNC: 8.9 MG/DL (ref 8.6–10.3)
CHLORIDE SERPL-SCNC: 98 MMOL/L (ref 98–107)
CHOLEST SERPL-MCNC: 117 MG/DL (ref 0–199)
CHOLESTEROL/HDL RATIO: 2.9
CO2 SERPL-SCNC: 26 MMOL/L (ref 21–32)
CREAT SERPL-MCNC: 1.3 MG/DL (ref 0.5–1.05)
CREAT UR-MCNC: 35.8 MG/DL (ref 20–320)
EGFRCR SERPLBLD CKD-EPI 2021: 42 ML/MIN/1.73M*2
ERYTHROCYTE [DISTWIDTH] IN BLOOD BY AUTOMATED COUNT: 13.3 % (ref 11.5–14.5)
GLUCOSE SERPL-MCNC: 99 MG/DL (ref 74–99)
HCT VFR BLD AUTO: 39.2 % (ref 36–46)
HDLC SERPL-MCNC: 41 MG/DL
HGB BLD-MCNC: 12.6 G/DL (ref 12–16)
LDLC SERPL CALC-MCNC: 48 MG/DL
MCH RBC QN AUTO: 28.8 PG (ref 26–34)
MCHC RBC AUTO-ENTMCNC: 32.1 G/DL (ref 32–36)
MCV RBC AUTO: 90 FL (ref 80–100)
MICROALBUMIN UR-MCNC: 7.1 MG/L
MICROALBUMIN/CREAT UR: 19.8 UG/MG CREAT
NON HDL CHOLESTEROL: 76 MG/DL (ref 0–149)
NRBC BLD-RTO: 0 /100 WBCS (ref 0–0)
PHOSPHATE SERPL-MCNC: 3.5 MG/DL (ref 2.5–4.9)
PLATELET # BLD AUTO: 181 X10*3/UL (ref 150–450)
POTASSIUM SERPL-SCNC: 4.7 MMOL/L (ref 3.5–5.3)
PROT SERPL-MCNC: 6.2 G/DL (ref 6.4–8.2)
PTH-INTACT SERPL-MCNC: 37.5 PG/ML (ref 18.5–88)
RBC # BLD AUTO: 4.37 X10*6/UL (ref 4–5.2)
SODIUM SERPL-SCNC: 132 MMOL/L (ref 136–145)
T4 FREE SERPL-MCNC: 1.6 NG/DL (ref 0.61–1.12)
TRIGL SERPL-MCNC: 139 MG/DL (ref 0–149)
TSH SERPL-ACNC: 1.37 MIU/L (ref 0.44–3.98)
VLDL: 28 MG/DL (ref 0–40)
WBC # BLD AUTO: 7.8 X10*3/UL (ref 4.4–11.3)

## 2024-08-26 PROCEDURE — 82043 UR ALBUMIN QUANTITATIVE: CPT

## 2024-08-26 PROCEDURE — 82306 VITAMIN D 25 HYDROXY: CPT

## 2024-08-26 PROCEDURE — 84443 ASSAY THYROID STIM HORMONE: CPT

## 2024-08-26 PROCEDURE — 80061 LIPID PANEL: CPT

## 2024-08-26 PROCEDURE — 84439 ASSAY OF FREE THYROXINE: CPT

## 2024-08-26 PROCEDURE — 82570 ASSAY OF URINE CREATININE: CPT

## 2024-08-26 PROCEDURE — 85027 COMPLETE CBC AUTOMATED: CPT

## 2024-08-26 PROCEDURE — 80053 COMPREHEN METABOLIC PANEL: CPT

## 2024-08-26 PROCEDURE — 83970 ASSAY OF PARATHORMONE: CPT

## 2024-08-26 PROCEDURE — 84100 ASSAY OF PHOSPHORUS: CPT

## 2024-08-27 ENCOUNTER — TELEPHONE (OUTPATIENT)
Dept: CARDIOLOGY | Facility: CLINIC | Age: 79
End: 2024-08-27
Payer: COMMERCIAL

## 2024-08-27 NOTE — TELEPHONE ENCOUNTER
----- Message from Nurse Jennifer GARCIA sent at 8/26/2024  2:28 PM EDT -----    ----- Message -----  From: David Rice MD  Sent: 8/26/2024   1:13 PM EDT  To: Jennifer Leal, LPN    Cholesterol very good  ----- Message -----  From: Lab, Background User  Sent: 8/26/2024  12:44 PM EDT  To: David Rice MD

## 2024-08-29 DIAGNOSIS — E03.9 HYPOTHYROIDISM, UNSPECIFIED: Primary | ICD-10-CM

## 2024-08-29 DIAGNOSIS — E55.9 VITAMIN D DEFICIENCY, UNSPECIFIED: ICD-10-CM

## 2024-09-11 DIAGNOSIS — E87.6 HYPOKALEMIA: ICD-10-CM

## 2024-09-11 RX ORDER — POTASSIUM CHLORIDE 750 MG/1
10 TABLET, EXTENDED RELEASE ORAL DAILY
Qty: 90 TABLET | Refills: 3 | Status: SHIPPED | OUTPATIENT
Start: 2024-09-11

## 2024-09-11 NOTE — TELEPHONE ENCOUNTER
Received request for prescription refills for patient.   Patient follows with Dr. Rice    Request is for Potassium chloride  Is patient currently on medication yes    Last OV 8/15/2024  Next OV 8/14/2025    Pended for signing and sent to provider

## 2024-09-29 DIAGNOSIS — E78.2 MIXED HYPERLIPIDEMIA: ICD-10-CM

## 2024-09-30 RX ORDER — ATORVASTATIN CALCIUM 10 MG/1
10 TABLET, FILM COATED ORAL DAILY
Qty: 90 TABLET | Refills: 3 | Status: SHIPPED | OUTPATIENT
Start: 2024-09-30

## 2024-09-30 NOTE — TELEPHONE ENCOUNTER
Received request for prescription refills for patient.   Patient follows with Dr. Maddy Rivera MD     Request is for refill  Is patient currently on medication yes  Last OV 8/30/24  Next OV 7/11/25    Pended for signing and sent to provider

## 2024-10-09 DIAGNOSIS — R32 URINARY INCONTINENCE, UNSPECIFIED TYPE: ICD-10-CM

## 2024-10-09 RX ORDER — ESTRADIOL 0.1 MG/G
CREAM VAGINAL
Qty: 42.5 G | Refills: 3 | Status: SHIPPED | OUTPATIENT
Start: 2024-10-09

## 2024-10-12 DIAGNOSIS — I10 ESSENTIAL HYPERTENSION: ICD-10-CM

## 2024-10-14 RX ORDER — METOPROLOL SUCCINATE 25 MG/1
25 TABLET, EXTENDED RELEASE ORAL DAILY
Qty: 90 TABLET | Refills: 3 | Status: SHIPPED | OUTPATIENT
Start: 2024-10-14

## 2024-10-14 NOTE — TELEPHONE ENCOUNTER
Received request for prescription refills for patient.   Patient follows with Sallie Garnett CNP and Dr. Rivera    Request is for Toprol XL   Is patient currently on medication yes    Last OV 7/12/24  Next OV 7/11/25    Pended for signing and sent to provider

## 2024-11-14 ENCOUNTER — LAB (OUTPATIENT)
Dept: LAB | Facility: LAB | Age: 79
End: 2024-11-14
Payer: MEDICARE

## 2024-11-14 DIAGNOSIS — E55.9 VITAMIN D DEFICIENCY, UNSPECIFIED: ICD-10-CM

## 2024-11-14 DIAGNOSIS — E03.9 HYPOTHYROIDISM, UNSPECIFIED: ICD-10-CM

## 2024-11-14 LAB
25(OH)D3 SERPL-MCNC: 59 NG/ML (ref 30–100)
ALBUMIN SERPL BCP-MCNC: 3.9 G/DL (ref 3.4–5)
ALP SERPL-CCNC: 128 U/L (ref 33–136)
ALT SERPL W P-5'-P-CCNC: 10 U/L (ref 7–45)
ANION GAP SERPL CALC-SCNC: 13 MMOL/L (ref 10–20)
AST SERPL W P-5'-P-CCNC: 13 U/L (ref 9–39)
BILIRUB SERPL-MCNC: 1.1 MG/DL (ref 0–1.2)
BUN SERPL-MCNC: 22 MG/DL (ref 6–23)
CALCIUM SERPL-MCNC: 9 MG/DL (ref 8.6–10.3)
CHLORIDE SERPL-SCNC: 99 MMOL/L (ref 98–107)
CO2 SERPL-SCNC: 27 MMOL/L (ref 21–32)
CREAT SERPL-MCNC: 1.36 MG/DL (ref 0.5–1.05)
EGFRCR SERPLBLD CKD-EPI 2021: 40 ML/MIN/1.73M*2
GLUCOSE SERPL-MCNC: 97 MG/DL (ref 74–99)
POTASSIUM SERPL-SCNC: 4.5 MMOL/L (ref 3.5–5.3)
PROT SERPL-MCNC: 6.2 G/DL (ref 6.4–8.2)
SODIUM SERPL-SCNC: 134 MMOL/L (ref 136–145)
T4 FREE SERPL-MCNC: 1.58 NG/DL (ref 0.61–1.12)
TSH SERPL-ACNC: 0.72 MIU/L (ref 0.44–3.98)

## 2024-11-14 PROCEDURE — 84443 ASSAY THYROID STIM HORMONE: CPT

## 2024-11-14 PROCEDURE — 80053 COMPREHEN METABOLIC PANEL: CPT

## 2024-11-14 PROCEDURE — 84481 FREE ASSAY (FT-3): CPT

## 2024-11-14 PROCEDURE — 36415 COLL VENOUS BLD VENIPUNCTURE: CPT

## 2024-11-14 PROCEDURE — 82306 VITAMIN D 25 HYDROXY: CPT

## 2024-11-14 PROCEDURE — 84439 ASSAY OF FREE THYROXINE: CPT

## 2024-11-15 LAB — T3FREE SERPL-MCNC: 2.8 PG/ML (ref 2.3–4.2)

## 2024-12-09 ENCOUNTER — APPOINTMENT (OUTPATIENT)
Dept: OBSTETRICS AND GYNECOLOGY | Facility: CLINIC | Age: 79
End: 2024-12-09
Payer: MEDICARE

## 2024-12-09 VITALS — SYSTOLIC BLOOD PRESSURE: 130 MMHG | WEIGHT: 146 LBS | BODY MASS INDEX: 28.04 KG/M2 | DIASTOLIC BLOOD PRESSURE: 84 MMHG

## 2024-12-09 DIAGNOSIS — N39.41 URGE INCONTINENCE OF URINE: ICD-10-CM

## 2024-12-09 LAB
POC APPEARANCE, URINE: CLEAR
POC BILIRUBIN, URINE: NEGATIVE
POC BLOOD, URINE: NEGATIVE
POC COLOR, URINE: YELLOW
POC GLUCOSE, URINE: NEGATIVE MG/DL
POC KETONES, URINE: NEGATIVE MG/DL
POC LEUKOCYTES, URINE: ABNORMAL
POC NITRITE,URINE: NEGATIVE
POC PH, URINE: 6.5 PH
POC PROTEIN, URINE: NEGATIVE MG/DL
POC SPECIFIC GRAVITY, URINE: 1.01
POC UROBILINOGEN, URINE: 0.2 EU/DL

## 2024-12-09 PROCEDURE — 87086 URINE CULTURE/COLONY COUNT: CPT

## 2024-12-09 PROCEDURE — 52287 CYSTOSCOPY CHEMODENERVATION: CPT | Performed by: OBSTETRICS & GYNECOLOGY

## 2024-12-09 PROCEDURE — 81003 URINALYSIS AUTO W/O SCOPE: CPT | Performed by: OBSTETRICS & GYNECOLOGY

## 2024-12-09 RX ORDER — DOXYCYCLINE HYCLATE 100 MG
100 TABLET ORAL ONCE
Status: SHIPPED | OUTPATIENT
Start: 2024-12-09

## 2024-12-09 RX ORDER — LIDOCAINE HYDROCHLORIDE 20 MG/ML
1 JELLY TOPICAL ONCE
Status: COMPLETED | OUTPATIENT
Start: 2024-12-09 | End: 2024-12-09

## 2024-12-09 ASSESSMENT — PATIENT HEALTH QUESTIONNAIRE - PHQ9
SUM OF ALL RESPONSES TO PHQ9 QUESTIONS 1 & 2: 0
2. FEELING DOWN, DEPRESSED OR HOPELESS: NOT AT ALL
1. LITTLE INTEREST OR PLEASURE IN DOING THINGS: NOT AT ALL

## 2024-12-11 LAB — BACTERIA UR CULT: NORMAL

## 2024-12-12 NOTE — PROGRESS NOTES
"GYN PROGRESS NOTE          Chief complaint: Urge incontinence    HPI:  Patient answers are not available for this visit.  HPI       Procedure     Additional comments: Est Pt              Comments    Katt is here today as an EST patient. She is a 80 yo patient here for a Cystoscopy with Intradetrusor Botox.  Her last visit was  6/6/2024  Today, she has complaints of the last week had incontinence   The procedure was explained by Hilda GARCIA and Dr. Juarez  Consent was signed.  The patient had no questions or concerns at this time.  Doxycyline 100mg PO was given per office protocol.  See MAR  Time out was completed.  Botox 100 Units \" office supply\"  I/O UA obtained see results.   LOT [id number]  EXP  03/30/2027  Chaperone Hilda GARCIA/ CARLOS Flowers  Discharge instructions were reviewed.  The patient did not have any questions or concerns at this time. A follow up was scheduled.          Last edited by Tram Edge RN on 12/9/2024  9:27 AM.            ROS:  GEN - no fevers or chills  RESP - no SOB or cough  GYN - see HPI      HISTORY:  Past Medical History:   Diagnosis Date    A-fib (Multi)     Anesthesia of skin 11/17/2020    Numbness and tingling    Basal cell carcinoma     Body mass index (BMI) 31.0-31.9, adult 01/07/2022    BMI 31.0-31.9,adult    Chronic kidney disease     STAGE 3    Coronary artery disease     Fractures     GERD (gastroesophageal reflux disease)     Hearing aid worn     Heart valve disease     HL (hearing loss)     Hyperlipidemia     Hypertension     Hypothyroidism     Other general symptoms and signs 11/17/2020    Eye, ear, nose, and throat symptom    Other specified counseling 01/07/2022    Encounter for medication counseling    Overactive bladder     Overweight 01/07/2022    Overweight    Pain in unspecified knee     Knee pain    Person consulting for explanation of examination or test findings 01/07/2022    Encounter to discuss test results    Personal history of (healed) traumatic fracture "     History of fracture of foot    Personal history of other complications of pregnancy, childbirth and the puerperium     History of spontaneous     Personal history of other diseases of the circulatory system     History of hypertension    Personal history of other diseases of the circulatory system 2020    History of cardiac disorder    Personal history of other diseases of the digestive system     History of hiatal hernia    Personal history of other diseases of the musculoskeletal system and connective tissue 2020    History of arthritis    Personal history of other diseases of the respiratory system 2021    History of sinusitis    Personal history of other endocrine, nutritional and metabolic disease     History of hyperlipidemia    Personal history of other medical treatment 2015    History of screening mammography    Personal history of other specified conditions 2021    History of dizziness    Unspecified visual loss 2020    Vision problems     Past Surgical History:   Procedure Laterality Date    BLADDER SURGERY      botox injection    CARDIAC CATHETERIZATION  2021    Percutaneous transluminal coronary angioplasty    CARPAL TUNNEL RELEASE Right     CARPAL TUNNEL RELEASE Left 2024    CATARACT EXTRACTION Bilateral 2014    Cataract Surgery    CHOLECYSTECTOMY  2014    Cholecystectomy    COLONOSCOPY  2021    DILATION AND CURETTAGE OF UTERUS  2014    Dilation And Curettage    ESOPHAGOGASTRODUODENOSCOPY  2022    HIATAL HERNIA REPAIR      PARTIAL KNEE ARTHROPLASTY Left 2021    SKIN SURGERY  2014    Shaving Of Lesion Mohs' Technique- Face    STAPEDES SURGERY Bilateral     TONSILLECTOMY  2020    TOTAL KNEE ARTHROPLASTY Right 2014    Knee Replacement    TUBAL LIGATION      WISDOM TOOTH EXTRACTION       Social History     Socioeconomic History    Marital status:      Spouse name: Not on file    Number  of children: Not on file    Years of education: Not on file    Highest education level: Not on file   Occupational History    Not on file   Tobacco Use    Smoking status: Never    Smokeless tobacco: Never   Vaping Use    Vaping status: Never Used   Substance and Sexual Activity    Alcohol use: Yes     Alcohol/week: 2.0 standard drinks of alcohol     Types: 2 Glasses of wine per week     Comment: Socially    Drug use: Never    Sexual activity: Not Currently     Partners: Male     Birth control/protection: Female Sterilization   Other Topics Concern    Not on file   Social History Narrative    Not on file     Social Drivers of Health     Financial Resource Strain: Not on file   Food Insecurity: Not on file   Transportation Needs: Not on file   Physical Activity: Not on file   Stress: Not on file   Social Connections: Not on file   Intimate Partner Violence: Not on file   Housing Stability: Not on file     Cancer-related family history is not on file.       PHYSICAL EXAM:  /84   Wt 66.2 kg (146 lb)   LMP  (LMP Unknown)   BMI 28.04 kg/m²   Physical examination:  General: No distress  Neck: No masses  Respiratory: No respiratory distress  GYN: Normal vulvar skin normal clitoral marx and clitoris labia majora and minora normal pink vaginal mucosa  perianal area: without lesions      Operative procedure: Operative cystoscopy intra-detrusor Botox injection  Dx: Urinary urgency    The patient was taken to the procedure room after informed consent was obtained.  Timeout was performed, birth date, patient name and procedure all discussed with the patient. She was placed in lithotomy position, half blade speculum placed in the posterior vagina to expose the urethra.  The urethra was prepped with Betadine.  1% lidocaine jelly was introduced to the urethral orifice. operative cystoscopy was placed inside the bladder surfaces were surveyed bilateral reflux was noted from the ureter orifices. A urethral inspection was then  performed upon removal of the cystoscope from the bladder.  The cystoscope was reintroduced with the needle and in a fan like projection 0.5ml aliquots ofdiluted Botox were introduced into the bladder muscle. Total Botox use was 100U in approximately 10ml saline. Scant bleeding was noted from the urothelial tissue.  The scope was removed from the patient's and the speculum was removed the patient tolerated the procedure well no complications bleeding minimal patient voiced understanding about postprocedure care.    IMPRESSION/PLAN:    Status post intradetrusor Botox    Plan follow-up in 6 months for repeat procedure        Arash Juarez MD

## 2025-01-02 DIAGNOSIS — R00.2 PALPITATIONS: ICD-10-CM

## 2025-01-02 DIAGNOSIS — I47.29 PAROXYSMAL VENTRICULAR TACHYCARDIA (MULTI): ICD-10-CM

## 2025-01-02 RX ORDER — LANOLIN ALCOHOL/MO/W.PET/CERES
1 CREAM (GRAM) TOPICAL 2 TIMES DAILY
Qty: 180 TABLET | Refills: 3 | Status: SHIPPED | OUTPATIENT
Start: 2025-01-02

## 2025-01-02 NOTE — TELEPHONE ENCOUNTER
Received request for prescription refills for patient.   Patient follows with Dr. Rice and Dr. Rivera     Request is for MagOx  Is patient currently on medication yes    Last OV 8/15/24  Next OV 2/14/25    Pended for signing and sent to provider

## 2025-01-06 ENCOUNTER — OFFICE VISIT (OUTPATIENT)
Dept: CARDIOLOGY | Facility: CLINIC | Age: 80
End: 2025-01-06
Payer: MEDICARE

## 2025-01-06 ENCOUNTER — OFFICE VISIT (OUTPATIENT)
Dept: ORTHOPEDIC SURGERY | Facility: CLINIC | Age: 80
End: 2025-01-06
Payer: MEDICARE

## 2025-01-06 VITALS
HEIGHT: 61 IN | BODY MASS INDEX: 26.96 KG/M2 | SYSTOLIC BLOOD PRESSURE: 124 MMHG | WEIGHT: 142.8 LBS | DIASTOLIC BLOOD PRESSURE: 68 MMHG | HEART RATE: 66 BPM

## 2025-01-06 DIAGNOSIS — Z87.891 FORMER SMOKER: ICD-10-CM

## 2025-01-06 DIAGNOSIS — E78.2 MIXED HYPERLIPIDEMIA: ICD-10-CM

## 2025-01-06 DIAGNOSIS — R00.2 PALPITATIONS: Primary | ICD-10-CM

## 2025-01-06 DIAGNOSIS — I10 ESSENTIAL HYPERTENSION: ICD-10-CM

## 2025-01-06 DIAGNOSIS — R00.1 BRADYCARDIA: ICD-10-CM

## 2025-01-06 DIAGNOSIS — I49.3 PVC (PREMATURE VENTRICULAR CONTRACTION): ICD-10-CM

## 2025-01-06 DIAGNOSIS — Z79.899 HIGH RISK MEDICATION USE: ICD-10-CM

## 2025-01-06 DIAGNOSIS — Z71.89 ENCOUNTER TO DISCUSS TREATMENT OPTIONS: ICD-10-CM

## 2025-01-06 DIAGNOSIS — I25.10 CORONARY ARTERY DISEASE INVOLVING NATIVE CORONARY ARTERY OF NATIVE HEART WITHOUT ANGINA PECTORIS: ICD-10-CM

## 2025-01-06 DIAGNOSIS — Z71.89 ENCOUNTER FOR MEDICATION REVIEW AND COUNSELING: ICD-10-CM

## 2025-01-06 DIAGNOSIS — R00.0 WIDE-COMPLEX TACHYCARDIA: ICD-10-CM

## 2025-01-06 DIAGNOSIS — I47.29 PAROXYSMAL VENTRICULAR TACHYCARDIA (MULTI): ICD-10-CM

## 2025-01-06 DIAGNOSIS — G56.02 CARPAL TUNNEL SYNDROME OF LEFT WRIST: Primary | ICD-10-CM

## 2025-01-06 PROCEDURE — 99214 OFFICE O/P EST MOD 30 MIN: CPT | Performed by: ORTHOPAEDIC SURGERY

## 2025-01-06 PROCEDURE — 1157F ADVNC CARE PLAN IN RCRD: CPT | Performed by: INTERNAL MEDICINE

## 2025-01-06 PROCEDURE — 1159F MED LIST DOCD IN RCRD: CPT | Performed by: ORTHOPAEDIC SURGERY

## 2025-01-06 PROCEDURE — 3074F SYST BP LT 130 MM HG: CPT | Performed by: INTERNAL MEDICINE

## 2025-01-06 PROCEDURE — 1157F ADVNC CARE PLAN IN RCRD: CPT | Performed by: ORTHOPAEDIC SURGERY

## 2025-01-06 PROCEDURE — 20526 THER INJECTION CARP TUNNEL: CPT | Mod: LT | Performed by: ORTHOPAEDIC SURGERY

## 2025-01-06 PROCEDURE — 93000 ELECTROCARDIOGRAM COMPLETE: CPT | Performed by: INTERNAL MEDICINE

## 2025-01-06 PROCEDURE — 3078F DIAST BP <80 MM HG: CPT | Performed by: INTERNAL MEDICINE

## 2025-01-06 PROCEDURE — 1036F TOBACCO NON-USER: CPT | Performed by: INTERNAL MEDICINE

## 2025-01-06 PROCEDURE — 2500000004 HC RX 250 GENERAL PHARMACY W/ HCPCS (ALT 636 FOR OP/ED): Performed by: ORTHOPAEDIC SURGERY

## 2025-01-06 PROCEDURE — 1036F TOBACCO NON-USER: CPT | Performed by: ORTHOPAEDIC SURGERY

## 2025-01-06 PROCEDURE — 99214 OFFICE O/P EST MOD 30 MIN: CPT | Performed by: INTERNAL MEDICINE

## 2025-01-06 RX ORDER — TRIAMCINOLONE ACETONIDE 40 MG/ML
10 INJECTION, SUSPENSION INTRA-ARTICULAR; INTRAMUSCULAR
Status: COMPLETED | OUTPATIENT
Start: 2025-01-06 | End: 2025-01-06

## 2025-01-06 RX ORDER — LIDOCAINE HYDROCHLORIDE 10 MG/ML
1 INJECTION, SOLUTION INFILTRATION; PERINEURAL
Status: COMPLETED | OUTPATIENT
Start: 2025-01-06 | End: 2025-01-06

## 2025-01-06 RX ADMIN — TRIAMCINOLONE ACETONIDE 10 MG: 40 INJECTION, SUSPENSION INTRA-ARTICULAR; INTRAMUSCULAR at 11:56

## 2025-01-06 RX ADMIN — LIDOCAINE HYDROCHLORIDE 1 ML: 10 INJECTION, SOLUTION INFILTRATION; PERINEURAL at 11:56

## 2025-01-06 ASSESSMENT — ENCOUNTER SYMPTOMS
PALPITATIONS: 0
DYSPNEA ON EXERTION: 0

## 2025-01-06 NOTE — PROGRESS NOTES
"Chief Complaint:   Follow-up     History Of Present Illness:    Katt Jacobson is a 79 y.o. female presenting with follow-up.  She was last seen in July 2024.  She saw the opening on the schedule and self scheduled follow-up.  She is routinely monitored twice a year because of amiodarone.    Last Recorded Vitals:  Vitals:    01/06/25 1628   BP: 124/68   BP Location: Left arm   Patient Position: Sitting   Pulse: 66   Weight: 64.8 kg (142 lb 12.8 oz)   Height: 1.537 m (5' 0.5\")       Past Medical History:  See List    Past Surgical History:  See List      Social History:  She reports that she has never smoked. She has never used smokeless tobacco. She reports current alcohol use of about 2.0 standard drinks of alcohol per week. She reports that she does not use drugs.    Family History:  Family History   Problem Relation Name Age of Onset    Heart disease Mother Lillian Myrick     Kidney disease Mother Lillian Myrick         Allergies:  Patient has no known allergies.    Outpatient Medications:  Current Outpatient Medications   Medication Instructions    amiodarone (PACERONE) 100 mg, oral, Daily    atorvastatin (LIPITOR) 10 mg, oral, Daily    cholecalciferol (Vitamin D-3) 50 mcg (2,000 unit) capsule oral    clopidogrel (PLAVIX) 75 mg, oral, Daily    estradiol (Estrace) 0.01 % (0.1 mg/gram) vaginal cream APPLY A PEA SIZE AMOUNT TO THE VULVA AND RUB IN COMPLETELY EVERY NIGHT    fluocinonide (Lidex) 0.05 % external solution Apply to scalp night before using Ketoconazole Shampoo    fluticasone (Flonase) 50 mcg/actuation nasal spray     furosemide (LASIX) 20 mg, oral, Daily    ketoconazole (NIZOral) 2 % shampoo 1 Application, Topical, See admin instructions, Apply as directed by physician    levothyroxine (Synthroid, Levoxyl) 50 mcg tablet 1 tablet, oral, Daily    lisinopril 20 mg, oral, Daily    magnesium oxide (MAG-OX) 400 mg, oral, 2 times daily    metoprolol succinate XL (KAPSPARGO SPRINKLE) 25 mg, oral, Daily    " metoprolol succinate XL (TOPROL-XL) 25 mg, oral, Daily    mv-min/FA/vit K/lutein/zeaxant (PRESERVISION AREDS 2 PLUS MV ORAL) 1 capsule, oral, 2 times daily    nitroglycerin (NITROSTAT) 0.4 mg, sublingual, Every 5 min PRN    pantoprazole (PROTONIX) 40 mg, oral, Daily    potassium chloride CR 10 mEq ER tablet 10 mEq, oral, Daily   Review of Systems   Cardiovascular:  Negative for chest pain, dyspnea on exertion and palpitations.   All other systems reviewed and are negative.  Had Botox injection for incontinence  Had left wrist injection for left wrist paresthesia thought secondary to carpal tunnel    Physical Exam:  Constitutional:       General: Awake.      Appearance: Normal and healthy appearance. Well-developed and not in distress.   Neck:      Vascular: No JVR. JVD normal.   Pulmonary:      Effort: Pulmonary effort is normal.      Breath sounds: Normal breath sounds. No wheezing. No rhonchi. No rales.   Chest:      Chest wall: Not tender to palpatation.   Cardiovascular:      PMI at left midclavicular line. Normal rate. Regular rhythm. Normal S1. Normal S2.       Murmurs: There is no murmur.      No gallop.  No click. No rub.   Pulses:     Intact distal pulses.   Edema:     Peripheral edema absent.   Abdominal:      Tenderness: There is no abdominal tenderness.   Musculoskeletal: Normal range of motion.         General: No tenderness. Skin:     General: Skin is warm and dry.   Neurological:      General: No focal deficit present.      Mental Status: Alert and oriented to person, place and time.            Last Labs:  CBC -  Lab Results   Component Value Date    WBC 7.8 08/26/2024    HGB 12.6 08/26/2024    HCT 39.2 08/26/2024    MCV 90 08/26/2024     08/26/2024       CMP -  Lab Results   Component Value Date    CALCIUM 9.0 11/14/2024    PHOS 3.5 08/26/2024    PROT 6.2 (L) 11/14/2024    ALBUMIN 3.9 11/14/2024    AST 13 11/14/2024    ALT 10 11/14/2024    ALKPHOS 128 11/14/2024    BILITOT 1.1 11/14/2024        LIPID PANEL -   Lab Results   Component Value Date    CHOL 117 08/26/2024    TRIG 139 08/26/2024    HDL 41.0 08/26/2024    CHHDL 2.9 08/26/2024    VLDL 28 08/26/2024    NHDL 76 08/26/2024       RENAL FUNCTION PANEL -   Lab Results   Component Value Date    GLUCOSE 97 11/14/2024     (L) 11/14/2024    K 4.5 11/14/2024    CL 99 11/14/2024    CO2 27 11/14/2024    ANIONGAP 13 11/14/2024    BUN 22 11/14/2024    CREATININE 1.36 (H) 11/14/2024    CALCIUM 9.0 11/14/2024    PHOS 3.5 08/26/2024    ALBUMIN 3.9 11/14/2024        Lab Results   Component Value Date    HGBA1C 6.1 (A) 10/17/2022       Last Cardiology Tests:  ECG:  ECG 12 lead (Clinic Performed) 07/12/2024  Today.  Sinus bradycardia.  Normal axis.  Corrected QT interval 440 ms      Lab review: I have personally reviewed the laboratory result(s) see above    Assessment/Plan   Diagnoses and all orders for this visit:  High risk medication use  Bradycardia  Coronary artery disease involving native coronary artery of native heart without angina pectoris  Mixed hyperlipidemia  Essential hypertension  Palpitations  Paroxysmal ventricular tachycardia (Multi)  PVC (premature ventricular contraction)  Wide-complex tachycardia  Former smoker  Encounter for medication review and counseling  Encounter to discuss treatment options  BMI 27.0-27.9,adult        Harika Orellana RN    Ventricular tachycardia. Wide-complex tachycardia with no inducible arrhythmias by diagnostic EP testing in 2011. PVCs. Chronic. Stable and suppressed with amiodarone. Normal EP conduction at baseline and with isoproterenol by EP testing November 2011.  Reviewed medications.  Continue medications.  Refills discussed.  High-risk medication of amiodarone.  Reviewed last blood work November 2024.  Ordered PFTs.  History of orthostatic dizziness by history.  Resolved. Reinforced behavioral modification.   Hypothyroidism, on replacement therapy. Stable. Chronic. Follows with endocrinology.    Coronary artery disease , chronic. Stable. History of cath in 2011 with minimal 20 to 30 percent in-stent circumflex restenosis. Asymptomatic. Continue with medical therapy. Refills . Follows with Dr. Rice  Stress test in 2015 with no infarct and no ischemia. Stable. Chronic. Follows with Dr. Rice.  Valvular heart disease with mild MR and TR, imparied relaxtion, and LVEF 60% by echo July 2021  Hypertension. Benign, chronic, better controlled.  Reviewed medications.  Reviewed last blood work.  Refills discussed.  Hyperlipidemia, chronic. Stable. On statin. Continue atorvastatin.  CKD stage III. Chronic. Stable. Reviewed blood work with patient.  Follows with renal  Hyponatremia.  Stable.  Follows with renal  Left arm and wrist paresthesias status post injection for left carpal tunnel.  Has follow-up with orthopedics.  Incontinence status post Botox injection.  Follows with OB/GYN  Remote cholecystectomy  Overweight.   AHA recommendations for exercise, diet, and behavioral modification reviewed with pt.     Counseling over 50% visit performed.  The patient and I discussed the amiodarone screening, last blood work, arrhythmia, ventricular tachycardia, ECG, PVCs, indications for and types of medications, discussion if and what medication refills needed, American Heart Association lifestyle changes and behavioral modification discussed. All questions answered in detail. Patient appreciative of care.

## 2025-01-06 NOTE — PATIENT INSTRUCTIONS
Continue same medications/treatment.  Patient educated on proper medication use.  Patient educated on risk factor modification.  Please bring any lab results from other providers/physicians to your next appointment.    Please bring all medicines, vitamins, and herbal supplements with you when you come to the office.    Prescriptions will not be filled unless you are compliant with your follow up appointments or have a follow up appointment scheduled as per instruction of your physician. Refills should be requested at the time of your visit.    Follow up with Dr. Rivera in 6 months    RHONDA HAIDER RN, AM SCRIBING FOR AND IN THE PRESENCE OF DR. THANH RIVERA MD, FACC, FACP, RS

## 2025-01-06 NOTE — PROGRESS NOTES
"    1/6/2025    Chief Complaint   Patient presents with    Left Wrist - Follow-up, Pain     CTR  DOS: 6/21/24       History of Present Illness:  Patient Katt Jacobson , 79 y.o. female, presents today, 1/6/2025, for evaluation of left wrist and forearm pain.  Patient is status post left carpal tunnel release done by Dr. Wallis on 6/21/2024.  She states that her numbness and tingling especially symptoms the hand improved overall after surgery.  She still has some numbness to the index and thumb that have been stable for some time.  However, over the last 2 weeks or so she describes \"shocks\" in the forearm especially at nighttime that are waking her from sleep.  She feels this is in the midline forearm running up towards the elbow.  She denies any new injury or trauma.  She feels this is similar to her symptoms of carpal tunnel syndrome she was experiencing prior to surgery.       Review of Systems:   GENERAL: Negative  GI: Negative  MUSCULOSKELETAL: See HPI  SKIN: Negative  NEURO:  Negative     Physical Exam:  GENERAL:  Alert and oriented to person, place, and time.  No acute distress and breathing comfortably; pleasant and cooperative with the examination.  HEENT:  Head is normocephalic and atraumatic.  NECK:  Supple, no visible swelling.  CARDIOVASCULAR:  No palpable tachycardia.  LUNGS:  No audible wheezing or labored breathing.  ABDOMEN:  Nondistended.  Extremities: Evaluation of left upper extremity finds the patient to have a palpable radial artery at the wrist with brisk capillary refill to all digits. The patient has intact sensorium to axillary, radial, median and ulnar nerves. There are no open wounds. There are no signs of infection. There is no evidence of lymphedema or lymphatic streaking. The patient has supple compartments of the left arm, forearm and hand.  Surgical incision demonstrates no dysesthesia.  However, she has positive Tinel's over the median nerve at the wrist.  This is very painful.  " She has positive Durkan's compression maneuver.     Imaging/Test Results:  None today.     Assessment:  Concerns for recurrence of left carpal tunnel syndrome, status post carpal tunnel release about 7 months prior.     Plan:  Treatment options were reviewed.  Recommendations were made for initial nonoperative manage by way of left carpal tunnel injection for diagnostic/therapeutic purposes.  This was formed off today by Dr. Wallis and tolerated well by patient.  She will monitor her symptoms.  Follow-up in about 4 weeks for repeat clinical exam.  No x-rays necessary upon return.  All questions answered today's visit.    Hand / UE Inj/Asp: L carpal tunnel for carpal tunnel syndrome on 1/6/2025 11:56 AM  Indications: diagnostic and therapeutic  Details: 25 G needle, volar approach  Medications: 1 mL lidocaine 10 mg/mL (1 %); 10 mg triamcinolone acetonide 40 mg/mL  Outcome: tolerated well, no immediate complications    Left Carpal Tunnel Injection: It was explained to the patient that the risks of a steroid injection include but are not limited to infection, local skin irritation, skin atrophy, calcification, continued pain and discomfort, elevated blood sugar, burning, failure to relieve pain, and possible late infection. The patient verbalized good insight and verbalized consent for the injection. It was further explained that the post-injection discomfort can be alleviated with additional medications, ice, elevation, and rest over the first 24 hours, and that these modalities are recommended.  After informed consent was provided, patient identification was confirmed, and allergies were verified, the patient was appropriately positioned. The site was marked and time-out performed.    Using aseptic technique, a solution containing 1 mL of 10 mg of Kenalog and 1 mL of 1% lidocaine without epinephrine was injected into the patient's left carpal tunnel. A 25-gauge needle was inserted just ulnar to the anatomic course  of the palmaris longus tendon at the level of the distal wrist flexion crease. It was slowly advanced deep to the distal antebrachial fascia. The solution was then injected slowly, taking care to ensure that the patient experienced no paresthesias during the injection of the solution. After injection of the solution, the patient was asked to perform active flexion and extension of the digits and wrist to help disperse the solution. A band-aid was then placed at the injection site. The patient tolerated this left carpal tunnel injection well.      Procedure, treatment alternatives, risks and benefits explained, specific risks discussed. Consent was given by the patient. Immediately prior to procedure a time out was called to verify the correct patient, procedure, equipment, support staff and site/side marked as required. Patient was prepped and draped in the usual sterile fashion.         In a face to face encounter, I performed a history and physical examination, discussed pertinent diagnostic studies if indicated, and discussed diagnosis and management strategies with both the patient and the mid-level provider. I reviewed the mid-level's note and agree with the documented findings and plan of care.  Patient presents today for evaluation status post left carpal tunnel release.  Patient describes occasional stabbing type pains along course of median nerve across the wrist radiating up the forearm.  Positive Tinel's over course of median nerve to left wrist.  Treatment options were discussed.  We talked about concerns for more proximal median nerve compression or symptomatic scarring about the nerve leading to a tethering type affect that is irritated through range of motion and general function.  Patient elects for trial of left carpal tunnel injection and for a 4-week follow-up.  No x-rays upon return.

## 2025-01-07 ENCOUNTER — TELEPHONE (OUTPATIENT)
Dept: CARDIOLOGY | Facility: CLINIC | Age: 80
End: 2025-01-07
Payer: COMMERCIAL

## 2025-01-07 DIAGNOSIS — Z79.899 HIGH RISK MEDICATION USE: Primary | ICD-10-CM

## 2025-01-07 NOTE — TELEPHONE ENCOUNTER
----- Message from Maddy Rivera sent at 1/6/2025  5:02 PM EST -----  Check last PFT's  Order PFT's  Notify pt pls

## 2025-01-07 NOTE — TELEPHONE ENCOUNTER
Called patient and informed her that we were ordering a PFT to be done soon. Patient verbalized understanding. Order placed and sent to physician for signature.

## 2025-01-13 ENCOUNTER — APPOINTMENT (OUTPATIENT)
Dept: CARDIOLOGY | Facility: CLINIC | Age: 80
End: 2025-01-13
Payer: MEDICARE

## 2025-01-14 ENCOUNTER — HOSPITAL ENCOUNTER (OUTPATIENT)
Dept: RESPIRATORY THERAPY | Facility: HOSPITAL | Age: 80
Discharge: HOME | End: 2025-01-14
Payer: MEDICARE

## 2025-01-14 DIAGNOSIS — Z79.899 HIGH RISK MEDICATION USE: ICD-10-CM

## 2025-01-14 LAB
MGC ASCENT PFT - FEV1 - PRE: 2.01
MGC ASCENT PFT - FEV1 - PREDICTED: 1.74
MGC ASCENT PFT - FVC - PRE: 2.54
MGC ASCENT PFT - FVC - PREDICTED: 2.24

## 2025-01-14 PROCEDURE — 94726 PLETHYSMOGRAPHY LUNG VOLUMES: CPT

## 2025-02-03 ENCOUNTER — APPOINTMENT (OUTPATIENT)
Dept: ORTHOPEDIC SURGERY | Facility: CLINIC | Age: 80
End: 2025-02-03
Payer: MEDICARE

## 2025-02-14 ENCOUNTER — APPOINTMENT (OUTPATIENT)
Dept: CARDIOLOGY | Facility: CLINIC | Age: 80
End: 2025-02-14
Payer: MEDICARE

## 2025-02-19 ENCOUNTER — OFFICE VISIT (OUTPATIENT)
Dept: CARDIOLOGY | Facility: CLINIC | Age: 80
End: 2025-02-19
Payer: MEDICARE

## 2025-02-19 VITALS
WEIGHT: 143 LBS | HEART RATE: 68 BPM | HEIGHT: 61 IN | SYSTOLIC BLOOD PRESSURE: 110 MMHG | BODY MASS INDEX: 27 KG/M2 | DIASTOLIC BLOOD PRESSURE: 64 MMHG

## 2025-02-19 DIAGNOSIS — Z87.891 FORMER SMOKER: ICD-10-CM

## 2025-02-19 DIAGNOSIS — E78.2 MIXED HYPERLIPIDEMIA: ICD-10-CM

## 2025-02-19 DIAGNOSIS — I25.10 CORONARY ARTERY DISEASE INVOLVING NATIVE CORONARY ARTERY OF NATIVE HEART WITHOUT ANGINA PECTORIS: ICD-10-CM

## 2025-02-19 DIAGNOSIS — I10 ESSENTIAL HYPERTENSION: ICD-10-CM

## 2025-02-19 DIAGNOSIS — N18.30 STAGE 3 CHRONIC KIDNEY DISEASE, UNSPECIFIED WHETHER STAGE 3A OR 3B CKD (MULTI): ICD-10-CM

## 2025-02-19 DIAGNOSIS — R00.0 WIDE-COMPLEX TACHYCARDIA: ICD-10-CM

## 2025-02-19 PROCEDURE — 99214 OFFICE O/P EST MOD 30 MIN: CPT | Performed by: INTERNAL MEDICINE

## 2025-02-19 PROCEDURE — 1157F ADVNC CARE PLAN IN RCRD: CPT | Performed by: INTERNAL MEDICINE

## 2025-02-19 PROCEDURE — 3074F SYST BP LT 130 MM HG: CPT | Performed by: INTERNAL MEDICINE

## 2025-02-19 PROCEDURE — 1036F TOBACCO NON-USER: CPT | Performed by: INTERNAL MEDICINE

## 2025-02-19 PROCEDURE — 3078F DIAST BP <80 MM HG: CPT | Performed by: INTERNAL MEDICINE

## 2025-02-19 PROCEDURE — 1159F MED LIST DOCD IN RCRD: CPT | Performed by: INTERNAL MEDICINE

## 2025-02-19 RX ORDER — IBUPROFEN 800 MG/1
1 TABLET ORAL EVERY 6 HOURS
COMMUNITY
Start: 2024-08-09 | End: 2025-02-19 | Stop reason: WASHOUT

## 2025-02-19 NOTE — PATIENT INSTRUCTIONS
Patient to follow up in August as previously scheduled with Dr. David Garcia MD      Can try over the counter Motrin or Ibuprofen 200mg  three times daily for a few weeks to see if that helps discomfort.     No changes today.   Continue same medications and treatments.   Patient educated on proper medication use.   Patient educated on risk factor modification.   Please bring any lab results from other providers / physicians to your next appointment.     Please bring all medicines, vitamins, and herbal supplements with you when you come to the office.     Prescriptions will not be filled unless you are compliant with your follow up appointments or have a follow up appointment scheduled as per instruction of your physician. Refills should be requested at the time of your visit.    IDoni RN am scribing for and in the presence of Dr. David Rice MD

## 2025-02-19 NOTE — PROGRESS NOTES
CARDIOLOGY OFFICE VISIT      CHIEF COMPLAINT      HISTORY OF PRESENT ILLNESS  The patient is being seen today because of left sided chest discomfort.  The patient states this started Monday.  She states that she was getting a lot of things out for a dinner party and may have injured the area.  This is the first time she has had new things like this after her   in the last year.  She states that as long as she is quiet it does not bother.  If she twists and turns in a certain direction it bothers her.  I told her this sounds definitely musculoskeletal.  She is not having any symptoms to suggest myocardial ischemia.  She denies dyspnea activities.  She denies palpitations syncope.  She denies any problems or medications.  I told her she could try some over-the-counter ibuprofen which I think will ease off her symptoms quicker.  She will let me know of any further problems or concerns.      IMPRESSION:   1. Coronary artery disease, no angina.  2. Essential hypertension.  3. Mixed hyperlipidemia.  4. History of wide-complex tachycardia, being managed with amiodarone. Dr. Rivera  5. Overweight  6. Chronic kidney disease, stage III, followed by Dr. Smith.     Please excuse any errors in grammar or translation related to this dictation. Voice recognition software was utilized to prepare this document.       Past Medical History  Past Medical History:   Diagnosis Date    A-fib (Multi)     Anesthesia of skin 2020    Numbness and tingling    Basal cell carcinoma     Body mass index (BMI) 31.0-31.9, adult 2022    BMI 31.0-31.9,adult    Chronic kidney disease     STAGE 3    Coronary artery disease     Fractures     GERD (gastroesophageal reflux disease)     Hearing aid worn     Heart valve disease     HL (hearing loss)     Hyperlipidemia     Hypertension     Hypothyroidism     Other general symptoms and signs 2020    Eye, ear, nose, and throat symptom    Other specified counseling 2022     Encounter for medication counseling    Overactive bladder     Overweight 2022    Overweight    Pain in unspecified knee     Knee pain    Person consulting for explanation of examination or test findings 2022    Encounter to discuss test results    Personal history of (healed) traumatic fracture     History of fracture of foot    Personal history of other complications of pregnancy, childbirth and the puerperium     History of spontaneous     Personal history of other diseases of the circulatory system     History of hypertension    Personal history of other diseases of the circulatory system 2020    History of cardiac disorder    Personal history of other diseases of the digestive system     History of hiatal hernia    Personal history of other diseases of the musculoskeletal system and connective tissue 2020    History of arthritis    Personal history of other diseases of the respiratory system 2021    History of sinusitis    Personal history of other endocrine, nutritional and metabolic disease     History of hyperlipidemia    Personal history of other medical treatment 2015    History of screening mammography    Personal history of other specified conditions 2021    History of dizziness    Unspecified visual loss 2020    Vision problems       Social History  Social History     Tobacco Use    Smoking status: Never    Smokeless tobacco: Never   Vaping Use    Vaping status: Never Used   Substance Use Topics    Alcohol use: Yes     Alcohol/week: 2.0 standard drinks of alcohol     Types: 2 Glasses of wine per week     Comment: Socially    Drug use: Never       Family History     Family History   Problem Relation Name Age of Onset    Heart disease Mother Lillian Myrick     Kidney disease Mother Lillian Myrick         Allergies:  No Known Allergies     Outpatient Medications:  Current Outpatient Medications   Medication Instructions    amiodarone (PACERONE) 100 mg,  oral, Daily    atorvastatin (LIPITOR) 10 mg, oral, Daily    cholecalciferol (Vitamin D-3) 50 mcg (2,000 unit) capsule Take by mouth.    clopidogrel (PLAVIX) 75 mg, oral, Daily    estradiol (Estrace) 0.01 % (0.1 mg/gram) vaginal cream APPLY A PEA SIZE AMOUNT TO THE VULVA AND RUB IN COMPLETELY EVERY NIGHT    fluocinonide (Lidex) 0.05 % external solution Apply to scalp night before using Ketoconazole Shampoo    fluticasone (Flonase) 50 mcg/actuation nasal spray     furosemide (LASIX) 20 mg, Daily    ketoconazole (NIZOral) 2 % shampoo 1 Application, See admin instructions    levothyroxine (Synthroid, Levoxyl) 50 mcg tablet 1 tablet, Daily    lisinopril 20 mg, Daily    magnesium oxide (MAG-OX) 400 mg, oral, 2 times daily    metoprolol succinate XL (KAPSPARGO SPRINKLE) 25 mg, oral, Daily    metoprolol succinate XL (TOPROL-XL) 25 mg, oral, Daily    mv-min/FA/vit K/lutein/zeaxant (PRESERVISION AREDS 2 PLUS MV ORAL) 1 capsule, 2 times daily    nitroglycerin (NITROSTAT) 0.4 mg, Every 5 min PRN    pantoprazole (PROTONIX) 40 mg, Daily    potassium chloride CR 10 mEq ER tablet 10 mEq, oral, Daily          REVIEW OF SYSTEMS  Review of Systems   All other systems reviewed and are negative.        VITALS  Vitals:    02/19/25 1415   BP: 110/64   Pulse: 68       PHYSICAL EXAM  Vitals reviewed.   Constitutional:       Appearance: Normal and healthy appearance. Well-developed and not in distress.   Eyes:      Conjunctiva/sclera: Conjunctivae normal.      Pupils: Pupils are equal, round, and reactive to light.   Neck:      Vascular: No JVR. JVD normal.   Pulmonary:      Effort: Pulmonary effort is normal.      Breath sounds: Normal breath sounds. No wheezing. No rhonchi. No rales.   Chest:      Chest wall: Not tender to palpatation.   Cardiovascular:      PMI at left midclavicular line. Normal rate. Regular rhythm. Normal S1. Normal S2.       Murmurs: There is no murmur.      No gallop.  No click. No rub.   Pulses:     Intact distal  pulses.   Edema:     Peripheral edema absent.   Abdominal:      Tenderness: There is no abdominal tenderness.   Musculoskeletal: Normal range of motion.         General: No tenderness.      Cervical back: Normal range of motion. Skin:     General: Skin is warm and dry.   Neurological:      General: No focal deficit present.      Mental Status: Alert and oriented to person, place and time.   Psychiatric:         Behavior: Behavior is cooperative.           ASSESSMENT AND PLAN  Diagnoses and all orders for this visit:  Coronary artery disease involving native coronary artery of native heart without angina pectoris  Essential hypertension  Mixed hyperlipidemia  Wide-complex tachycardia  BMI 27.0-27.9,adult  Stage 3 chronic kidney disease, unspecified whether stage 3a or 3b CKD (Multi)  Former smoker      [unfilled]

## 2025-02-26 DIAGNOSIS — I47.29 PAROXYSMAL VENTRICULAR TACHYCARDIA (MULTI): ICD-10-CM

## 2025-02-26 RX ORDER — AMIODARONE HYDROCHLORIDE 200 MG/1
TABLET ORAL
Qty: 45 TABLET | Refills: 1 | Status: SHIPPED | OUTPATIENT
Start: 2025-02-26

## 2025-02-27 DIAGNOSIS — I25.10 CORONARY ARTERY DISEASE INVOLVING NATIVE CORONARY ARTERY OF NATIVE HEART WITHOUT ANGINA PECTORIS: ICD-10-CM

## 2025-02-27 RX ORDER — NITROGLYCERIN 0.4 MG/1
TABLET SUBLINGUAL
Qty: 25 TABLET | Refills: 5 | Status: SHIPPED | OUTPATIENT
Start: 2025-02-27

## 2025-02-27 NOTE — TELEPHONE ENCOUNTER
Received request for prescription refill for patient.  Patient follows with Dr. David Rice MD     Request is for nitroglycerin   Is patient currently on medication- yes    Last OV- 2/19/25  Next OV- 8/14/25    Pended for signing and sent to provider.

## 2025-05-01 ENCOUNTER — APPOINTMENT (OUTPATIENT)
Dept: OBSTETRICS AND GYNECOLOGY | Facility: CLINIC | Age: 80
End: 2025-05-01
Payer: MEDICARE

## 2025-05-12 ENCOUNTER — APPOINTMENT (OUTPATIENT)
Dept: OBSTETRICS AND GYNECOLOGY | Facility: CLINIC | Age: 80
End: 2025-05-12
Payer: MEDICARE

## 2025-05-21 ENCOUNTER — APPOINTMENT (OUTPATIENT)
Dept: OBSTETRICS AND GYNECOLOGY | Facility: CLINIC | Age: 80
End: 2025-05-21
Payer: MEDICARE

## 2025-05-21 VITALS — WEIGHT: 143 LBS | BODY MASS INDEX: 27.47 KG/M2

## 2025-05-21 DIAGNOSIS — N39.41 URGE INCONTINENCE OF URINE: ICD-10-CM

## 2025-05-21 LAB
POC APPEARANCE, URINE: CLEAR
POC BILIRUBIN, URINE: NEGATIVE
POC BLOOD, URINE: ABNORMAL
POC COLOR, URINE: YELLOW
POC GLUCOSE, URINE: NEGATIVE MG/DL
POC KETONES, URINE: NEGATIVE MG/DL
POC LEUKOCYTES, URINE: ABNORMAL
POC NITRITE,URINE: NEGATIVE
POC PH, URINE: 6 PH
POC PROTEIN, URINE: NEGATIVE MG/DL
POC SPECIFIC GRAVITY, URINE: 1.01
POC UROBILINOGEN, URINE: 0.2 EU/DL

## 2025-05-21 PROCEDURE — 81003 URINALYSIS AUTO W/O SCOPE: CPT | Performed by: OBSTETRICS & GYNECOLOGY

## 2025-05-21 PROCEDURE — 52287 CYSTOSCOPY CHEMODENERVATION: CPT | Performed by: OBSTETRICS & GYNECOLOGY

## 2025-05-21 RX ORDER — DOXYCYCLINE HYCLATE 100 MG
100 TABLET ORAL ONCE
Status: COMPLETED | OUTPATIENT
Start: 2025-05-21 | End: 2025-05-21

## 2025-05-21 RX ORDER — LIDOCAINE HYDROCHLORIDE 20 MG/ML
1 JELLY TOPICAL ONCE
Status: COMPLETED | OUTPATIENT
Start: 2025-05-21 | End: 2025-05-21

## 2025-05-21 RX ADMIN — Medication 100 MG: at 09:34

## 2025-05-21 RX ADMIN — LIDOCAINE HYDROCHLORIDE 1 APPLICATION: 20 JELLY TOPICAL at 09:35

## 2025-05-21 NOTE — PROGRESS NOTES
GYN PROGRESS NOTE          Chief complaint: Urge incontinence    HPI:  Patient answers are not available for this visit.  HPI       Procedure     Additional comments: Cystoscopy with Intradetrusor 100 units Botox               Comments     Katt is a 80 EST patient here today for a Cystoscopy with Intradetrusor Botox.  Her last visit was 12/9/2024   Today, Pt has no complaints, the botox is working well she reports.   The procedure was explained by RN and Dr. Juarez  Consent was reviewed with, and signed by Pt.  Pt denies questions or concerns.       Doxycyline 100mg PO was given per office protocol.  See MAR    Time out was completed.  Botox 100 Units office supplied  I/O UA obtained see results.   LOT: [id number]  EXP: 05/31/2027    Post-procedure instructions reviewed, Pt verbalized understanding. Copy provided to Pt.   Chaperoned by CARLOS Looney                Last edited by Tram Edge RN on 5/21/2025  9:20 AM.            ROS:  GEN - no fevers or chills  RESP - no SOB or cough  GYN - see HPI      HISTORY:  Medical History[1]  Surgical History[2]  Social History     Socioeconomic History    Marital status:      Spouse name: Not on file    Number of children: Not on file    Years of education: Not on file    Highest education level: Not on file   Occupational History    Not on file   Tobacco Use    Smoking status: Never    Smokeless tobacco: Never   Vaping Use    Vaping status: Never Used   Substance and Sexual Activity    Alcohol use: Yes     Alcohol/week: 2.0 standard drinks of alcohol     Types: 2 Glasses of wine per week     Comment: Socially    Drug use: Never    Sexual activity: Not Currently     Partners: Male     Birth control/protection: Female Sterilization   Other Topics Concern    Not on file   Social History Narrative    Not on file     Social Drivers of Health     Financial Resource Strain: Not on file   Food Insecurity: Not on file   Transportation Needs: Not on file   Physical  Activity: Not on file   Stress: Not on file   Social Connections: Not on file   Intimate Partner Violence: Not on file   Housing Stability: Not on file     Cancer-related family history is not on file.       PHYSICAL EXAM:  Wt 64.9 kg (143 lb)   LMP  (LMP Unknown)   BMI 27.47 kg/m²   Physical examination:  General: No distress  Neck: No masses  Respiratory: No respiratory distress  GYN: Normal vulvar skin normal clitoral marx and clitoris labia majora and minora normal pink vaginal mucosa  perianal area: without lesions      Operative procedure: Operative cystoscopy intra-detrusor Botox injection  Dx: Urinary urgency    The patient was taken to the procedure room after informed consent was obtained.  Timeout was performed, birth date, patient name and procedure all discussed with the patient. She was placed in lithotomy position, half blade speculum placed in the posterior vagina to expose the urethra.  The urethra was prepped with Betadine.  1% lidocaine jelly was introduced to the urethral orifice. operative cystoscopy was placed inside the bladder surfaces were surveyed bilateral reflux was noted from the ureter orifices. A urethral inspection was then performed upon removal of the cystoscope from the bladder.  The cystoscope was reintroduced with the needle and in a fan like projection 0.5ml aliquots ofdiluted Botox were introduced into the bladder muscle. Total Botox use was 100U in approximately 10ml saline. Scant bleeding was noted from the urothelial tissue.  The scope was removed from the patient's and the speculum was removed the patient tolerated the procedure well no complications bleeding minimal patient voiced understanding about postprocedure care.    IMPRESSION/PLAN:    Status post intradetrusor Botox    Plan follow-up in 5 months for repeat procedure        Arash Juarez MD         [1]   Past Medical History:  Diagnosis Date    A-fib (Multi)     Anesthesia of skin 11/17/2020    Numbness and  tingling    Basal cell carcinoma     Body mass index (BMI) 31.0-31.9, adult 2022    BMI 31.0-31.9,adult    Chronic kidney disease     STAGE 3    Coronary artery disease     Fractures     GERD (gastroesophageal reflux disease)     Hearing aid worn     Heart valve disease     HL (hearing loss)     Hyperlipidemia     Hypertension     Hypothyroidism     Other general symptoms and signs 2020    Eye, ear, nose, and throat symptom    Other specified counseling 2022    Encounter for medication counseling    Overactive bladder     Overweight 2022    Overweight    Pain in unspecified knee     Knee pain    Person consulting for explanation of examination or test findings 2022    Encounter to discuss test results    Personal history of (healed) traumatic fracture     History of fracture of foot    Personal history of other complications of pregnancy, childbirth and the puerperium     History of spontaneous     Personal history of other diseases of the circulatory system     History of hypertension    Personal history of other diseases of the circulatory system 2020    History of cardiac disorder    Personal history of other diseases of the digestive system     History of hiatal hernia    Personal history of other diseases of the musculoskeletal system and connective tissue 2020    History of arthritis    Personal history of other diseases of the respiratory system 2021    History of sinusitis    Personal history of other endocrine, nutritional and metabolic disease     History of hyperlipidemia    Personal history of other medical treatment 2015    History of screening mammography    Personal history of other specified conditions 2021    History of dizziness    Unspecified visual loss 2020    Vision problems   [2]   Past Surgical History:  Procedure Laterality Date    BLADDER SURGERY      botox injection    CARDIAC CATHETERIZATION  2021     Percutaneous transluminal coronary angioplasty    CARPAL TUNNEL RELEASE Right     CARPAL TUNNEL RELEASE Left 06/21/2024    CATARACT EXTRACTION Bilateral 02/27/2014    Cataract Surgery    CHOLECYSTECTOMY  02/27/2014    Cholecystectomy    COLONOSCOPY  12/06/2021    DILATION AND CURETTAGE OF UTERUS  02/27/2014    Dilation And Curettage    ESOPHAGOGASTRODUODENOSCOPY  07/27/2022    HIATAL HERNIA REPAIR      PARTIAL KNEE ARTHROPLASTY Left 12/06/2021    SKIN SURGERY  02/27/2014    Shaving Of Lesion Mohs' Technique- Face    STAPEDES SURGERY Bilateral     TONSILLECTOMY  11/17/2020    TOTAL KNEE ARTHROPLASTY Right 02/27/2014    Knee Replacement    TUBAL LIGATION      WISDOM TOOTH EXTRACTION

## 2025-06-17 DIAGNOSIS — I25.10 CORONARY ARTERY DISEASE INVOLVING NATIVE CORONARY ARTERY OF NATIVE HEART WITHOUT ANGINA PECTORIS: ICD-10-CM

## 2025-06-17 NOTE — TELEPHONE ENCOUNTER
Received request for prescription refills for patient.   Patient follows with Dr. Rice    Request is for clopidogrel  Is patient currently on medication yes    Last OV 2/19/2025  Next OV 8/14/2025    Pended for signing and sent to provider

## 2025-06-18 RX ORDER — CLOPIDOGREL BISULFATE 75 MG/1
75 TABLET ORAL DAILY
Qty: 90 TABLET | Refills: 3 | Status: SHIPPED | OUTPATIENT
Start: 2025-06-18 | End: 2026-06-18

## 2025-06-28 DIAGNOSIS — E87.6 HYPOKALEMIA: ICD-10-CM

## 2025-06-30 RX ORDER — POTASSIUM CHLORIDE 750 MG/1
10 TABLET, FILM COATED, EXTENDED RELEASE ORAL DAILY
Qty: 90 TABLET | Refills: 3 | Status: SHIPPED | OUTPATIENT
Start: 2025-06-30 | End: 2026-06-30

## 2025-06-30 NOTE — TELEPHONE ENCOUNTER
Received request for prescription refills for patient.   Patient follows with Dr. Rice    Request is for potassium chloride  Is patient currently on medication yes    Last OV 2/19/2025  Next OV 8/14/2025    Pended for signing and sent to provider

## 2025-07-10 NOTE — PROGRESS NOTES
"  Patient ID: Katt Jacobson is a 80 y.o. female who presents for Follow-up (1 year follow up on ischemia of heart, paroxysmal ventricular tachycardia, wide complex tachycardia, and PVC management ).  History of Present Illness  Katt Jacobson is a 80 year old female who presents for a cardiovascular follow-up visit.    She is currently taking metoprolol succinate 25 mg daily, though she did not take it today as she took it last night. She notes discoloration around her ankle, described as 'really blue,' without any swelling. She uses compression stockings in the winter but finds them uncomfortable in the summer. No new symptoms or concerns during the review of systems.    Patient denies any arrhythmia symptoms of palpitation, lightheadedness, near syncope, or syncope.    She has multiple veins over her left greater than right ankle.  Discussed compression stockings which she reports that she wears during the wintertime.  Behavior modifications reinforced.  If change in symptoms such as pain or edema, she will notify the office and we will then refer to vein clinic.      PMHx:  See list    PSHx:  See list    Social Hx:  Social History[1]    Family Hx:  Family History[2]    Review of Systems   Cardiovascular:  Negative for chest pain, dyspnea on exertion and palpitations.   All other systems reviewed and are negative.        Objective     /60 (BP Location: Left arm, Patient Position: Sitting)   Pulse 62   Ht (!) 1.537 m (5' 0.5\")   Wt 64.5 kg (142 lb 3.2 oz)   LMP  (LMP Unknown)   BMI 27.31 kg/m²        LABS:  CBC:   Lab Results   Component Value Date    WBC 7.8 08/26/2024    RBC 4.37 08/26/2024    HGB 12.6 08/26/2024    HCT 39.2 08/26/2024    MCV 90 08/26/2024    MCH 28.8 08/26/2024    MCHC 32.1 08/26/2024    RDW 13.3 08/26/2024     08/26/2024    MPV 10.6 10/24/2023     CBC with Differential:    Lab Results   Component Value Date    WBC 7.8 08/26/2024    RBC 4.37 08/26/2024    HGB 12.6 " 08/26/2024    HCT 39.2 08/26/2024     08/26/2024    MCV 90 08/26/2024    MCH 28.8 08/26/2024    MCHC 32.1 08/26/2024    RDW 13.3 08/26/2024    NRBC 0.0 08/26/2024    LYMPHOPCT 27.9 07/11/2022    MONOPCT 8.8 07/11/2022    EOSPCT 2.1 07/11/2022    BASOPCT 0.7 07/11/2022    MONOSABS 0.79 07/11/2022    LYMPHSABS 2.51 07/11/2022    EOSABS 0.19 07/11/2022    BASOSABS 0.06 07/11/2022     CMP:    Lab Results   Component Value Date     (L) 11/14/2024    K 4.5 11/14/2024    CL 99 11/14/2024    CO2 27 11/14/2024    BUN 22 11/14/2024    CREATININE 1.36 (H) 11/14/2024    GLUCOSE 97 11/14/2024    PROT 6.2 (L) 11/14/2024    CALCIUM 9.0 11/14/2024    BILITOT 1.1 11/14/2024    ALKPHOS 128 11/14/2024    AST 13 11/14/2024    ALT 10 11/14/2024     BMP:    Lab Results   Component Value Date     (L) 11/14/2024    K 4.5 11/14/2024    CL 99 11/14/2024    CO2 27 11/14/2024    BUN 22 11/14/2024    CREATININE 1.36 (H) 11/14/2024    CALCIUM 9.0 11/14/2024    GLUCOSE 97 11/14/2024     Magnesium:  Lab Results   Component Value Date    MG 2.10 02/11/2022           Physical Exam  Constitutional:       General: Awake.      Appearance: Normal and healthy appearance. Well-developed and not in distress.   Neck:      Vascular: No JVR. JVD normal.   Pulmonary:      Effort: Pulmonary effort is normal.      Breath sounds: Normal breath sounds. No wheezing. No rhonchi. No rales.   Chest:      Chest wall: Not tender to palpatation.   Cardiovascular:      PMI at left midclavicular line. Normal rate. Regular rhythm. Normal S1. Normal S2.       Murmurs: There is no murmur.      No gallop.  No click. No rub.   Pulses:     Intact distal pulses.   Edema:     Peripheral edema absent.   Abdominal:      Tenderness: There is no abdominal tenderness.   Musculoskeletal: Normal range of motion.         General: No tenderness. Skin:     General: Skin is warm and dry.   Neurological:      General: No focal deficit present.      Mental Status: Alert and  oriented to person, place and time.         PFTs January 2025.  Normal DLCO  ECG. See scanned.    Assessment & Plan  Ventricular tachycardia. Wide-complex tachycardia with no inducible arrhythmias by diagnostic EP testing in 2011.  Normal EP conduction at baseline and with isoproterenol by EP testing November 2011. Suppressed with amiodarone  PVCs. .    High-risk medication of amiodarone.   History of orthostatic dizziness  Currently on metoprolol succinate 25 mg daily. ECG normal. Continued cardiac monitoring necessary.  - Continue metoprolol succinate 25 mg daily.  - Schedule ECG monitoring twice a year.  -Order TFTs and LFTs at next visit.  Order PFTs at next visit    Peripheral venous insufficiency  Bluish discoloration around ankle due to venous insufficiency. No acute concern. Uses compression stockings in winter.  - Advise minimizing swelling to prevent worsening.  - Consider referral to peripheral vascular specialist if symptoms worsen.    Thyroid function monitoring  Recent thyroid function test results not available. Advised follow-up with Doctor Sheldon.  - Follow up with Doctor Sheldon regarding thyroid function test results.  - Check with [facility] to ensure results are available and released.    Routine follow-up  Ensure all test results are available and follow up with specialists as needed.  - Ensure all test results, including thyroid function tests, are available and reviewed.    Counseled greater than 50% of the visit.  The patient and I discussed arrhythmia, ECG, shared decision making, PFTs, available blood work from 7 months ago, amiodarone, treatment options, risk, benefits, and imponderables.  Lifestyle modifications reviewed.  All questions answered.  Patient appreciative of care  Assessment & Plan  Ischemia of heart, chronic    Paroxysmal ventricular tachycardia    Tricuspid valve insufficiency, unspecified etiology    PVC (premature ventricular contraction)    Wide-complex  tachycardia    Mixed hyperlipidemia    High risk medication use    BMI 27.0-27.9,adult    Encounter to discuss treatment options    Encounter for medication review and counseling          This medical note was created with the assistance of artificial intelligence (AI) for documentation purposes. The content has been reviewed and confirmed by the healthcare provider for accuracy and completeness. Patient consented to the use of audio recording and use of AI during their visit.     I, , personally performed the services described in the documentation as scribed by the nurse in my presence, and confirm it is both accurate and complete.     Total, 26, inclusive of review of amiodarone screening, and update of lab results       [1]   Social History  Socioeconomic History    Marital status:    Tobacco Use    Smoking status: Never    Smokeless tobacco: Never   Vaping Use    Vaping status: Never Used   Substance and Sexual Activity    Alcohol use: Yes     Alcohol/week: 2.0 standard drinks of alcohol     Types: 2 Glasses of wine per week     Comment: Socially    Drug use: Never    Sexual activity: Not Currently     Partners: Male     Birth control/protection: Female Sterilization   [2]   Family History  Problem Relation Name Age of Onset    Heart disease Mother Lillian Myrick     Kidney disease Mother Lillian Myrick

## 2025-07-11 ENCOUNTER — APPOINTMENT (OUTPATIENT)
Dept: CARDIOLOGY | Facility: CLINIC | Age: 80
End: 2025-07-11
Payer: COMMERCIAL

## 2025-07-11 VITALS
DIASTOLIC BLOOD PRESSURE: 60 MMHG | BODY MASS INDEX: 26.85 KG/M2 | WEIGHT: 142.2 LBS | HEART RATE: 62 BPM | HEIGHT: 61 IN | SYSTOLIC BLOOD PRESSURE: 112 MMHG

## 2025-07-11 DIAGNOSIS — Z71.89 ENCOUNTER TO DISCUSS TREATMENT OPTIONS: ICD-10-CM

## 2025-07-11 DIAGNOSIS — I07.1 TRICUSPID VALVE INSUFFICIENCY, UNSPECIFIED ETIOLOGY: ICD-10-CM

## 2025-07-11 DIAGNOSIS — I47.20 PAROXYSMAL VENTRICULAR TACHYCARDIA: Primary | ICD-10-CM

## 2025-07-11 DIAGNOSIS — Z71.89 ENCOUNTER FOR MEDICATION REVIEW AND COUNSELING: ICD-10-CM

## 2025-07-11 DIAGNOSIS — I25.9 ISCHEMIA OF HEART, CHRONIC: ICD-10-CM

## 2025-07-11 DIAGNOSIS — E78.2 MIXED HYPERLIPIDEMIA: ICD-10-CM

## 2025-07-11 DIAGNOSIS — Z79.899 HIGH RISK MEDICATION USE: ICD-10-CM

## 2025-07-11 DIAGNOSIS — R00.0 WIDE-COMPLEX TACHYCARDIA: ICD-10-CM

## 2025-07-11 DIAGNOSIS — I49.3 PVC (PREMATURE VENTRICULAR CONTRACTION): ICD-10-CM

## 2025-07-11 PROCEDURE — 3074F SYST BP LT 130 MM HG: CPT | Performed by: INTERNAL MEDICINE

## 2025-07-11 PROCEDURE — 99213 OFFICE O/P EST LOW 20 MIN: CPT | Performed by: INTERNAL MEDICINE

## 2025-07-11 PROCEDURE — 93000 ELECTROCARDIOGRAM COMPLETE: CPT | Performed by: INTERNAL MEDICINE

## 2025-07-11 PROCEDURE — 1036F TOBACCO NON-USER: CPT | Performed by: INTERNAL MEDICINE

## 2025-07-11 PROCEDURE — 1159F MED LIST DOCD IN RCRD: CPT | Performed by: INTERNAL MEDICINE

## 2025-07-11 PROCEDURE — 3078F DIAST BP <80 MM HG: CPT | Performed by: INTERNAL MEDICINE

## 2025-07-11 RX ORDER — AMIODARONE HYDROCHLORIDE 200 MG/1
100 TABLET ORAL DAILY
Qty: 45 TABLET | Refills: 3 | Status: SHIPPED | OUTPATIENT
Start: 2025-07-11

## 2025-07-11 RX ORDER — ATORVASTATIN CALCIUM 10 MG/1
10 TABLET, FILM COATED ORAL DAILY
Qty: 90 TABLET | Refills: 3 | Status: SHIPPED | OUTPATIENT
Start: 2025-07-11

## 2025-07-11 ASSESSMENT — PATIENT HEALTH QUESTIONNAIRE - PHQ9
2. FEELING DOWN, DEPRESSED OR HOPELESS: NOT AT ALL
1. LITTLE INTEREST OR PLEASURE IN DOING THINGS: NOT AT ALL
SUM OF ALL RESPONSES TO PHQ9 QUESTIONS 1 AND 2: 0

## 2025-07-11 ASSESSMENT — COLUMBIA-SUICIDE SEVERITY RATING SCALE - C-SSRS
2. HAVE YOU ACTUALLY HAD ANY THOUGHTS OF KILLING YOURSELF?: NO
6. HAVE YOU EVER DONE ANYTHING, STARTED TO DO ANYTHING, OR PREPARED TO DO ANYTHING TO END YOUR LIFE?: NO
1. IN THE PAST MONTH, HAVE YOU WISHED YOU WERE DEAD OR WISHED YOU COULD GO TO SLEEP AND NOT WAKE UP?: NO

## 2025-07-11 ASSESSMENT — ENCOUNTER SYMPTOMS
DYSPNEA ON EXERTION: 0
PALPITATIONS: 0

## 2025-07-11 NOTE — PATIENT INSTRUCTIONS
Continue same medications/treatment.  Patient educated on proper medication use.  Patient educated on risk factor modification.  Please bring any lab results from other providers/physicians to your next appointment.    Please bring all medicines, vitamins, and herbal supplements with you when you come to the office.    Prescriptions will not be filled unless you are compliant with your follow up appointments or have a follow up appointment scheduled as per instruction of your physician. Refills should be requested at the time of your visit.    Follow up with our physician assistant, Lisa, in 6 months    RHONDA HAIDER RN, AM SCRIBING FOR AND IN THE PRESENCE OF DR. THANH CHRISTIANSON MD, FACC, FACP, FHRS

## 2025-07-31 DIAGNOSIS — R32 URINARY INCONTINENCE, UNSPECIFIED TYPE: ICD-10-CM

## 2025-07-31 RX ORDER — ESTRADIOL 0.1 MG/G
CREAM VAGINAL
Qty: 42.5 G | Refills: 3 | Status: SHIPPED | OUTPATIENT
Start: 2025-07-31

## 2025-08-04 ENCOUNTER — APPOINTMENT (OUTPATIENT)
Dept: CARDIOLOGY | Facility: CLINIC | Age: 80
End: 2025-08-04
Payer: MEDICARE

## 2025-08-04 VITALS
HEIGHT: 61 IN | WEIGHT: 142 LBS | HEART RATE: 60 BPM | BODY MASS INDEX: 26.81 KG/M2 | DIASTOLIC BLOOD PRESSURE: 64 MMHG | SYSTOLIC BLOOD PRESSURE: 124 MMHG

## 2025-08-04 DIAGNOSIS — N18.30 STAGE 3 CHRONIC KIDNEY DISEASE, UNSPECIFIED WHETHER STAGE 3A OR 3B CKD (MULTI): ICD-10-CM

## 2025-08-04 DIAGNOSIS — K44.9 HIATAL HERNIA WITH GERD: ICD-10-CM

## 2025-08-04 DIAGNOSIS — Z87.891 FORMER SMOKER: ICD-10-CM

## 2025-08-04 DIAGNOSIS — E78.2 MIXED HYPERLIPIDEMIA: ICD-10-CM

## 2025-08-04 DIAGNOSIS — R00.0 WIDE-COMPLEX TACHYCARDIA: ICD-10-CM

## 2025-08-04 DIAGNOSIS — I10 ESSENTIAL HYPERTENSION: ICD-10-CM

## 2025-08-04 DIAGNOSIS — K21.9 HIATAL HERNIA WITH GERD: ICD-10-CM

## 2025-08-04 DIAGNOSIS — I25.10 CORONARY ARTERY DISEASE INVOLVING NATIVE CORONARY ARTERY OF NATIVE HEART WITHOUT ANGINA PECTORIS: ICD-10-CM

## 2025-08-04 PROCEDURE — 1036F TOBACCO NON-USER: CPT | Performed by: INTERNAL MEDICINE

## 2025-08-04 PROCEDURE — 3078F DIAST BP <80 MM HG: CPT | Performed by: INTERNAL MEDICINE

## 2025-08-04 PROCEDURE — 1159F MED LIST DOCD IN RCRD: CPT | Performed by: INTERNAL MEDICINE

## 2025-08-04 PROCEDURE — 3074F SYST BP LT 130 MM HG: CPT | Performed by: INTERNAL MEDICINE

## 2025-08-04 PROCEDURE — 99214 OFFICE O/P EST MOD 30 MIN: CPT | Performed by: INTERNAL MEDICINE

## 2025-08-04 RX ORDER — PANTOPRAZOLE SODIUM 40 MG/1
40 TABLET, DELAYED RELEASE ORAL DAILY
Qty: 90 TABLET | Refills: 3 | Status: SHIPPED | OUTPATIENT
Start: 2025-08-04

## 2025-08-04 ASSESSMENT — PATIENT HEALTH QUESTIONNAIRE - PHQ9
SUM OF ALL RESPONSES TO PHQ9 QUESTIONS 1 AND 2: 0
1. LITTLE INTEREST OR PLEASURE IN DOING THINGS: NOT AT ALL
2. FEELING DOWN, DEPRESSED OR HOPELESS: NOT AT ALL

## 2025-08-04 ASSESSMENT — COLUMBIA-SUICIDE SEVERITY RATING SCALE - C-SSRS
1. IN THE PAST MONTH, HAVE YOU WISHED YOU WERE DEAD OR WISHED YOU COULD GO TO SLEEP AND NOT WAKE UP?: NO
2. HAVE YOU ACTUALLY HAD ANY THOUGHTS OF KILLING YOURSELF?: NO
6. HAVE YOU EVER DONE ANYTHING, STARTED TO DO ANYTHING, OR PREPARED TO DO ANYTHING TO END YOUR LIFE?: NO

## 2025-08-04 NOTE — PATIENT INSTRUCTIONS
Follow up office visit in 6 months.  Continue same medications/treatment.  Patient educated on proper medication use.  Patient educated on risk factor modification.  Please bring any lab results from other providers / physicians to your next appointment.    Please bring all medicines, vitamins and herbal supplements with you when you come to the office.    Prescriptions will not be filled unless you are compliant with your follow up appointments or have a follow up  appointment scheduled as per instruction of your physician.  Refills should be requested at the time of  Your visit.

## 2025-08-04 NOTE — PROGRESS NOTES
CARDIOLOGY OFFICE VISIT      CHIEF COMPLAINT  Chief Complaint   Patient presents with    Follow-up     1 year follow up on coronary artery disease, carotid bruit, essential hypertension, ischemia of heart, and mixed hyperlipidemia management         HISTORY OF PRESENT ILLNESS  The patient states that she has basically been doing well.  She states  Chest pain she has had recently when she was driving on [address] which she normally does not drive on.  She had some sharp twinges in different areas of her chest when she was driving.  I told her this is not cardiac in nature.  She denies dyspnea.  She denies prolonged palpitations, presyncope and syncope.  She denies any problem with her current medication.      IMPRESSION:   1. Coronary artery disease, no angina.  2. Essential hypertension.  3. Mixed hyperlipidemia.  4. History of wide-complex tachycardia, being managed with amiodarone. Dr. Rivera  5. Overweight  6. Chronic kidney disease, stage III, followed by Dr. Smith.     Please excuse any errors in grammar or translation related to this dictation. Voice recognition software was utilized to prepare this document.          Past Medical History  Medical History[1]    Social History  Social History[2]    Family History   Family History[3]     Allergies:  RX Allergies[4]     Outpatient Medications:  Current Outpatient Medications   Medication Instructions    amiodarone (PACERONE) 100 mg, oral, Daily    atorvastatin (LIPITOR) 10 mg, oral, Daily    cholecalciferol (Vitamin D-3) 50 mcg (2,000 unit) capsule Take by mouth.    clopidogrel (PLAVIX) 75 mg, oral, Daily    estradiol (Estrace) 0.01 % (0.1 mg/gram) vaginal cream APPLY A PEA SIZE AMOUNT TO THE VULVA AND RUB IN COMPLETELY EVERY NIGHT    fluocinonide (Lidex) 0.05 % external solution Apply to scalp night before using Ketoconazole Shampoo    furosemide (LASIX) 20 mg, Daily    ketoconazole (NIZOral) 2 % shampoo 1 Application, See admin instructions    levothyroxine (Synthroid,  Levoxyl) 50 mcg tablet 1 tablet, Daily    lisinopril 20 mg, Daily    magnesium oxide (MAG-OX) 400 mg, oral, 2 times daily    metoprolol succinate XL (KAPSPARGO SPRINKLE) 25 mg, oral, Daily    metoprolol succinate XL (TOPROL-XL) 25 mg, oral, Daily    mv-min/FA/vit K/lutein/zeaxant (PRESERVISION AREDS 2 PLUS MV ORAL) 1 capsule, 2 times daily    nitroglycerin (Nitrostat) 0.4 mg SL tablet PLACE 1 TABLET UNDER THE TONGUE EVERY 5 MINUTES FOR UP TO 3 DOSES AS NEEDED FOR CHEST PAIN. CALL 911 IF PAIN PERSISTS.    pantoprazole (PROTONIX) 40 mg, Daily    potassium chloride CR 10 mEq ER tablet 10 mEq, oral, Daily          REVIEW OF SYSTEMS  Review of Systems   All other systems reviewed and are negative.        VITALS  Vitals:    08/04/25 1415   BP: 124/64   Pulse: 60       PHYSICAL EXAM  Vitals and nursing note reviewed.   Constitutional:       Appearance: Healthy appearance.   Eyes:      Conjunctiva/sclera: Conjunctivae normal.      Pupils: Pupils are equal, round, and reactive to light.   Pulmonary:      Effort: Pulmonary effort is normal.      Breath sounds: Normal breath sounds.   Cardiovascular:      PMI at left midclavicular line. Normal rate. Regular rhythm.      Murmurs: There is no murmur.      No gallop.  No click. No rub.   Pulses:     Intact distal pulses.   Edema:     Peripheral edema absent.   Musculoskeletal: Normal range of motion. Skin:     General: Skin is warm and dry.   Neurological:      Mental Status: Alert and oriented to person, place and time.         ASSESSMENT AND PLAN  Diagnoses and all orders for this visit:  Coronary artery disease involving native coronary artery of native heart without angina pectoris  Essential hypertension  Mixed hyperlipidemia  Wide-complex tachycardia  Stage 3 chronic kidney disease, unspecified whether stage 3a or 3b CKD (Multi)  BMI 27.0-27.9,adult  Former smoker  Hiatal hernia with GERD      [unfilled]      IJennifer LPN am scribing for, and in the  presence of Dr. David Rice.    I, Dr. David Rice, personally performed the services described in the documentation as scribed by Jennifer Leal LPN in my presence, and confirm it is both accurate and complete.      Dr. David Omer MD  Thank you for allowing me to participate in the care of this patient. Please do not hesitate to contact me with any further questions or concerns.         [1]   Past Medical History:  Diagnosis Date    A-fib (Multi)     Anesthesia of skin 2020    Numbness and tingling    Arthritis     Basal cell carcinoma     Body mass index (BMI) 31.0-31.9, adult 2022    BMI 31.0-31.9,adult    Cataract     Chronic kidney disease     STAGE 3    Coronary artery disease     CTS (carpal tunnel syndrome)     Disease of thyroid gland     Fractures     GERD (gastroesophageal reflux disease)     Hearing aid worn     Heart valve disease     HL (hearing loss)     Hyperlipidemia     Hypertension     Hypothyroidism     Other general symptoms and signs 2020    Eye, ear, nose, and throat symptom    Other specified counseling 2022    Encounter for medication counseling    Overactive bladder     Overweight 2022    Overweight    Pain in unspecified knee     Knee pain    Person consulting for explanation of examination or test findings 2022    Encounter to discuss test results    Personal history of (healed) traumatic fracture     History of fracture of foot    Personal history of other complications of pregnancy, childbirth and the puerperium     History of spontaneous     Personal history of other diseases of the circulatory system     History of hypertension    Personal history of other diseases of the circulatory system 2020    History of cardiac disorder    Personal history of other diseases of the digestive system     History of hiatal hernia    Personal history of other diseases of the musculoskeletal system and connective  tissue 11/17/2020    History of arthritis    Personal history of other diseases of the respiratory system 12/06/2021    History of sinusitis    Personal history of other endocrine, nutritional and metabolic disease     History of hyperlipidemia    Personal history of other medical treatment 07/28/2015    History of screening mammography    Personal history of other specified conditions 12/06/2021    History of dizziness    Unspecified visual loss 11/17/2020    Vision problems    Varicella    [2]   Social History  Tobacco Use    Smoking status: Never    Smokeless tobacco: Never   Vaping Use    Vaping status: Never Used   Substance Use Topics    Alcohol use: Yes     Alcohol/week: 2.0 standard drinks of alcohol     Types: 2 Glasses of wine per week     Comment: Socially    Drug use: Never   [3]   Family History  Problem Relation Name Age of Onset    Heart disease Mother Lillian Myrick     Kidney disease Mother Lillian Myrick    [4] No Known Allergies

## 2025-08-13 ENCOUNTER — APPOINTMENT (OUTPATIENT)
Dept: CARDIOLOGY | Facility: CLINIC | Age: 80
End: 2025-08-13
Payer: MEDICARE

## 2025-08-13 DIAGNOSIS — I10 ESSENTIAL HYPERTENSION: ICD-10-CM

## 2025-08-13 RX ORDER — METOPROLOL SUCCINATE 25 MG/1
25 TABLET, EXTENDED RELEASE ORAL DAILY
Qty: 90 TABLET | Refills: 3 | Status: SHIPPED | OUTPATIENT
Start: 2025-08-13

## 2025-08-14 ENCOUNTER — APPOINTMENT (OUTPATIENT)
Dept: CARDIOLOGY | Facility: CLINIC | Age: 80
End: 2025-08-14
Payer: MEDICARE

## 2025-10-22 ENCOUNTER — APPOINTMENT (OUTPATIENT)
Dept: OBSTETRICS AND GYNECOLOGY | Facility: CLINIC | Age: 80
End: 2025-10-22
Payer: COMMERCIAL

## 2026-01-13 ENCOUNTER — APPOINTMENT (OUTPATIENT)
Dept: CARDIOLOGY | Facility: CLINIC | Age: 81
End: 2026-01-13
Payer: MEDICARE

## 2026-02-09 ENCOUNTER — APPOINTMENT (OUTPATIENT)
Dept: CARDIOLOGY | Facility: CLINIC | Age: 81
End: 2026-02-09
Payer: COMMERCIAL

## (undated) DEVICE — GLOVE, SURGICAL, PROTEXIS PI , 7.5, PF, LF

## (undated) DEVICE — STRAP, VELCRO, BODY, 4 X 60IN, NS

## (undated) DEVICE — BANDAGE, COHESIVE, HAND TEAR, COFLEX, 2 X 5 YDS, LF

## (undated) DEVICE — SUTURE, ETHILON, 4-0, BLK, MONO, PS-2 18

## (undated) DEVICE — GOWN, SURGICAL, ROYAL SILK, XL, STERILE

## (undated) DEVICE — Device

## (undated) DEVICE — TOWELS 4-PK

## (undated) DEVICE — PADDING, CAST, SPECIALIST, 3 IN X 4 YD, STERILE

## (undated) DEVICE — GLOVE, SURGICAL, PROTEXIS PI , 7.0, PF, LF

## (undated) DEVICE — SYRINGE, 60 CC, IRRIGATION, BULB, CONTRO-BULB, PAPER POUCH

## (undated) DEVICE — DRESSING, NON-ADHERENT, 3 X 3 IN, STERILE

## (undated) DEVICE — APPLICATOR, CHLORAPREP, W/ORANGE TINT, 26ML

## (undated) DEVICE — SUTURE, ETHILON, 3-0, 18 IN, PS1, BLACK